# Patient Record
Sex: FEMALE | Race: WHITE | Employment: FULL TIME | URBAN - METROPOLITAN AREA
[De-identification: names, ages, dates, MRNs, and addresses within clinical notes are randomized per-mention and may not be internally consistent; named-entity substitution may affect disease eponyms.]

---

## 2021-11-23 ENCOUNTER — OFFICE VISIT (OUTPATIENT)
Dept: SURGERY | Age: 51
End: 2021-11-23
Payer: COMMERCIAL

## 2021-11-23 VITALS
SYSTOLIC BLOOD PRESSURE: 152 MMHG | TEMPERATURE: 98.1 F | HEART RATE: 98 BPM | HEIGHT: 70 IN | BODY MASS INDEX: 41.95 KG/M2 | DIASTOLIC BLOOD PRESSURE: 86 MMHG | WEIGHT: 293 LBS | OXYGEN SATURATION: 97 % | RESPIRATION RATE: 18 BRPM

## 2021-11-23 DIAGNOSIS — K21.9 CHRONIC GERD: ICD-10-CM

## 2021-11-23 DIAGNOSIS — K58.2 IRRITABLE BOWEL SYNDROME WITH BOTH CONSTIPATION AND DIARRHEA: ICD-10-CM

## 2021-11-23 DIAGNOSIS — E66.01 MORBID OBESITY (HCC): Primary | ICD-10-CM

## 2021-11-23 DIAGNOSIS — G47.33 OSA (OBSTRUCTIVE SLEEP APNEA): ICD-10-CM

## 2021-11-23 DIAGNOSIS — E88.81 INSULIN RESISTANCE: ICD-10-CM

## 2021-11-23 PROCEDURE — 99204 OFFICE O/P NEW MOD 45 MIN: CPT | Performed by: NURSE PRACTITIONER

## 2021-11-23 RX ORDER — FAMOTIDINE 10 MG/1
10 TABLET ORAL AS NEEDED
COMMUNITY
End: 2022-01-11

## 2021-11-23 RX ORDER — MULTIVIT WITH MINERALS/HERBS
1 TABLET ORAL DAILY
COMMUNITY

## 2021-11-23 RX ORDER — ACETAMINOPHEN 500 MG
TABLET ORAL DAILY
COMMUNITY
End: 2022-02-11 | Stop reason: DRUGHIGH

## 2021-11-23 NOTE — PATIENT INSTRUCTIONS
NEXT STEPS:     Surgery type: Possible sleeve gastrectomy vs gastric bypass   Surgeon:  Nakia Bautista MD         Medical Weight Loss program:  Dr. Mamta Rodriguez [phone]       1. Make an appointment with one of the bariatric dietitians to get started with your nutrition classes. Please call the bariatric line at [phone]. Appointments are offered in person and virtual at no charge. [facility] requires a certain number of months of supervised counseling for weight loss, exercise and nutrition before approval for surgery. **    ___ 2 months (60 days)   [address], [address] [place], [address], [employer], [address] [facility], [employer]    ___ 3 months (90 days)   [address], [address] [facility], [place], [address], [address] [facility]    ___ 4 months (120 days) Medicare    ___ 6 months (180 days)    [address] [facility] , Rit Bharat, [address], [address] [facility], [address] [facility], [address] [facility] [address], [facility], [address], [address] [facility], [address] [facility] ([facility])[address], [place]    _x__ 12 months (365 days)   [facility]    2. Call to set up your psychological evaluation with any of the providers below:     [address]  The [facility] [address] behavioral [address], Psy. D [phone]       3. You will be scheduled for an upper GI xray prior to surgery and central scheduling will contact you to arrange. You can reach them at [phone] if needed. 4.  H. Pylori screening and this is a lab (blood) test.   The surgical coordinator will mail you a lab order. If this test is positive, additional testing will be needed prior to surgery. What is Helicobacter pylori (H. pylori)?   Helicobacter pylori (hell-ee-ko-back-ter pie-christian-ee) is a bacteria that can cause infection in the stomach. It is linked to the development of diseases such as dyspepsia, gastritis, and peptic ulcers. It has also been linked to stomach cancer. 5.  You may need other evaluations / testing prior to surgery including:   _x__ Upper endoscopy    ___ Cardiology evaluation     ___ Sleep study / evaluation for sleep apnea     ___ Diabetes treatment center education (goal HgA1C prior to surgery is < 8%)    _x__ 5% weight loss pre surgery (15 lbs)     6. You must be 100% smoke free (tobacco and marijuana) for at least 3 months prior to surgery. Surgery will be cancelled if you are smoking.             If you have any insurance questions or questions about the process, you can contact one of the bariatric coordinators via e-mail:     [email or web address]   or   [email or web address]

## 2021-11-23 NOTE — PROGRESS NOTES
1. Have you been to the ER, urgent care clinic since your last visit? Hospitalized since your last visit? 2. Have you seen or consulted any other health care providers outside of the [address] [place] [address] since your last visit? Include any pap smears or colon screening.

## 2021-12-01 DIAGNOSIS — Z76.89 ENCOUNTER FOR WEIGHT MANAGEMENT: Primary | ICD-10-CM

## 2021-12-01 DIAGNOSIS — E66.01 MORBID OBESITY (HCC): ICD-10-CM

## 2021-12-03 NOTE — PROGRESS NOTES
Chief Complaint   Patient presents with    New Patient     completed seminar, interested in sleeve gastrectomy    Morbid Obesity    Weight Loss       Tiffani Novak is a new patient seeking surgical treatment for morbid obesity. Patient has had obesity for 20+ years. Referred by self. Body mass index is 51.14 kg/m². Seminar  On line     Dietary Habits follows a whole food, plant-based diet. She has been vegetarian for about 25 years. She typically skips breakfast.  She eats a lot of vegetables and plant-based meat substitutes. Liquids  2 cups of coffee and water        Exercise  was walking 10,000 steps a day however she has been having a lot of knee and back issues.     Previous weight reduction efforts    _x__ Unsupervised diets  _x__ Weight Watchers   ___ On line weight loss programs   _x__ Medically supervised weight loss   _x__ Low carb (Keto, Atkins)  ___ Prescription obesity medication       Adult High weight  356 lbs   Adult Low weight  165 lbs     Surgical Risk factors:  General     Diabetes  insulin resistant  insulin no    Current smoker within past 12 months  no     Functional Health Status  yes independent    Partially dependent    Totally dependent    Unknown   Pulmonary     COPD  no   oxygen dependent  no     MAHI (requiring CPAP/BiPap) yes      Gastrointestinal     GERD requiring medication past 30 days  yes      Musculoskeletal     Is ambulation limited most of the time or all the time no     Cardiac    Previous MI no        Hypertension requiring medication  no         Hyperlipidemia requiring medication no     Vascular     History of DVT no         Venous stasis no     IVC filter  no      Renal    Dialysis  no     Renal insufficiency  no     Other  Steroids/Immunosuppressants for chronic condition no      Patient Active Problem List   Diagnosis Code    Morbid obesity with BMI of 50.0-59.9, adult ([facility]) E66.01, Z68.43       Past Surgical History:   Procedure Laterality Date    HX APPENDECTOMY  1985    HX CHOLECYSTECTOMY  2009     Past Medical History:   Diagnosis Date    GERD (gastroesophageal reflux disease)     IBS (irritable bowel syndrome)     Insulin resistance     Morbid obesity (HCC)     Sleep apnea      Family History   Problem Relation Age of Onset    Heart Disease Maternal Grandmother     Hypertension Maternal Grandmother     Diabetes Maternal Grandmother     Heart Disease Maternal Grandfather     Hypertension Maternal Grandfather     Heart Disease Paternal Grandmother     Hypertension Paternal Grandmother     Heart Disease Paternal Grandfather     Hypertension Paternal Grandfather      Social History     Socioeconomic History    Marital status:      Spouse name: Not on file    Number of children: Not on file    Years of education: Not on file    Highest education level: Not on file   Occupational History    Not on file   Tobacco Use    Smoking status: Former Smoker     Packs/day: 1.00     Years: 10.00     Pack years: 10.00     Types: Cigarettes     Quit date:      Years since quittin.9    Smokeless tobacco: Never Used   Substance and Sexual Activity    Alcohol use: Yes     Alcohol/week: 3.0 standard drinks     Types: 3 Glasses of wine per week     Comment: weekends    Drug use: Not Currently    Sexual activity: Not on file   Other Topics Concern    Not on file   Social History Narrative    In the home with her spouse and son who is 32    She works for the [employer]     Social Determinants of Health     Financial Resource Strain:     Difficulty of Paying Living Expenses: Not on file   Food Insecurity:     Worried About [address] in the Last Year: Not on file    [address] in the Last Year: Not on file   Transportation Needs:     Lack of Transportation (Medical): Not on file    Lack of Transportation (Non-Medical):  Not on file   Physical Activity:     Days of Exercise per Week: Not on file    Minutes of Exercise per Session: Not on file   Stress:     Feeling of Stress : Not on file   Social Connections:     Frequency of Communication with Friends and Family: Not on file    Frequency of Social Gatherings with Friends and Family: Not on file    Attends Mandaen Services: Not on file    Active Member of [address] [place] [address] or Organizations: Not on file    Attends Club or Organization Meetings: Not on file    Marital Status: Not on file   Intimate Partner Violence:     Fear of Current or Ex-Partner: Not on file    Emotionally Abused: Not on file    Physically Abused: Not on file    Sexually Abused: Not on file   Housing Stability:     Unable to Pay for Housing in the Last Year: Not on file    Number of [place] in the Last Year: Not on file    Unstable Housing in the Last Year: Not on file       Current Outpatient Medications:     famotidine (Pepcid AC) 10 mg tablet, Take 10 mg by mouth as needed for Heartburn or Gastroesophageal Reflux Disease (GERD). , Disp: , Rfl:     cholecalciferol (VITAMIN D3) (2,000 UNITS /50 MCG) cap capsule, Take  by mouth daily. , Disp: , Rfl:     b complex vitamins tablet, Take 1 Tablet by mouth daily. , Disp: , Rfl:     prenatal 46/EIGQ fum/folic/dha (PRENATAL-1 PO), Take  by mouth., Disp: , Rfl:   No Known Allergies  Bshsi, Not On File, FNP-C    Review of Systems   Constitutional: Positive for malaise/fatigue. Respiratory: Positive for shortness of breath. On CPAP   Cardiovascular: Negative for chest pain, palpitations and leg swelling. Gastrointestinal: Positive for constipation, diarrhea and heartburn. Takes Pepcid  Had endoscopy 5 years ago and was told she had a polyp  She is scheduled for GI visit 12/14/2021 with Dr. Reema Lewis   Genitourinary: Positive for urgency. Musculoskeletal: Positive for back pain, joint pain and myalgias.         Back hip and knee pain   Endo/Heme/Allergies:        Been told she is insulin resistant Psychiatric/Behavioral: Positive for depression. Physical Exam  Constitutional:       Appearance: She is obese. Comments: BP (!) 152/86 Comment: recheck manually after 15 min. anxiety D/T visit per patient  Pulse 98   Temp 98.1 °F (36.7 °C) (Oral)   Resp 18   Ht 5' 10\" (1.778 m)   Wt (!) 356 lb 6.4 oz (161.7 kg)   SpO2 97%   BMI 51.14 kg/m²   Tearful throughout the visit   Cardiovascular:      Rate and Rhythm: Normal rate and regular rhythm. Pulmonary:      Effort: Pulmonary effort is normal.      Breath sounds: Normal breath sounds. Abdominal:      Palpations: Abdomen is soft. Comments: Obese   Musculoskeletal:      Comments: Ambulating independently   Neurological:      General: No focal deficit present. Mental Status: She is alert and oriented to person, place, and time. ICD-10-CM ICD-9-CM    1. Morbid obesity (HCC)  E66.01 278.01 REFERRAL TO GASTROENTEROLOGY      XR UPPER GI SERIES W KUB   2. [facility] 50.0-59.9, [facility] ([facility]  Z68.43 V85.43    3. MAHI (obstructive sleep apnea)  G47.33 327.23    4. Chronic GERD  K21.9 530.81 REFERRAL TO GASTROENTEROLOGY      XR UPPER GI SERIES W KUB   5. Insulin resistance  E88.81 277.7    6. Irritable bowel syndrome with both constipation and diarrhea  K58.2 564.1        Connie Valdez meets criteria established by the NIH. Without weight reduction, co-morbidities will escalate as well as risk of early mortality. Recommendation is patient could be served with surgical weight reduction, the procedure of possibly sleeve gastrectomy however she needs evaluation endoscopically, upper GI and H. pylori testing. I have explained to her that gastric bypass would most likely be a preferred procedure in her situation with her history of insulin resistance, frequent GERD. I explained to the patient differences between laparoscopic gastric bypass and sleeve gastrectomy procedures.  Patient has participated in our informational session either in person or on line. She is also very interested in medical weight loss as she has to do a 12-month program with her insurance company. Procedure to be determined  Surgeon Alren Hinton MD    Recommendations:    _x__ Nutrition Evaluation    _x__ Psychological Evaluation    ___ Cardiac Evaluation    ___ Pulmonary Evaluation    ___ Sleep Medicine     ___ Diabetes Treatment Center     _x__ Upper GI    _x__ Upper endoscopy     _x__ H. Pylori screening (I requested H. pylori testing at the time of her upper endoscopy)    _x__ Smoking cessation x 90 days pre surgery     ___ Committee    _x__ 5% weight reduction (15 lbs)      I have reinforced without lifestyle change and behavior modification, Connie Valdez may not achieve weight loss goals. I reviewed risks and complications associated with each procedure. I discussed a diet high in protein, low-fat, low- sugar, limited carbohydrates, and discontinuing use of carbonated beverages and all sweet drinks. Importance of life long follow up was discussed as well as the chronic nature of obesity. Obesity is affected by multiple factors, including diet, exercise, stress, sleep, medication, age, hormones, etc.  Surgery is not a cure for obesity, yet can be an effective tool in the management of obesity. 45 minutes spent ___ virtually or in _x__ face to face with Geraldine Valdez > 50% counseling.     CC Bshsi, Not On File, FNP-C

## 2022-01-11 ENCOUNTER — OFFICE VISIT (OUTPATIENT)
Dept: SURGERY | Age: 52
End: 2022-01-11
Payer: COMMERCIAL

## 2022-01-11 ENCOUNTER — TELEPHONE (OUTPATIENT)
Dept: SURGERY | Age: 52
End: 2022-01-11

## 2022-01-11 VITALS
TEMPERATURE: 98.6 F | HEART RATE: 117 BPM | SYSTOLIC BLOOD PRESSURE: 139 MMHG | RESPIRATION RATE: 20 BRPM | BODY MASS INDEX: 41.95 KG/M2 | HEIGHT: 70 IN | WEIGHT: 293 LBS | DIASTOLIC BLOOD PRESSURE: 79 MMHG

## 2022-01-11 DIAGNOSIS — Z83.49 FAMILY HISTORY OF THYROID DISEASE: ICD-10-CM

## 2022-01-11 DIAGNOSIS — R00.0 INCREASED HEART RATE: ICD-10-CM

## 2022-01-11 DIAGNOSIS — Z82.49 FAMILY HISTORY OF ANEURYSM: ICD-10-CM

## 2022-01-11 DIAGNOSIS — Z78.9 VEGAN DIET: ICD-10-CM

## 2022-01-11 DIAGNOSIS — E55.9 VITAMIN D DEFICIENCY: ICD-10-CM

## 2022-01-11 DIAGNOSIS — E66.01 CLASS 3 SEVERE OBESITY DUE TO EXCESS CALORIES WITH SERIOUS COMORBIDITY AND BODY MASS INDEX (BMI) OF 50.0 TO 59.9 IN ADULT (HCC): Primary | ICD-10-CM

## 2022-01-11 DIAGNOSIS — Z82.49 FAMILY HISTORY OF BLOOD CLOTS: ICD-10-CM

## 2022-01-11 DIAGNOSIS — F43.22 ADJUSTMENT DISORDER WITH ANXIOUS MOOD: ICD-10-CM

## 2022-01-11 DIAGNOSIS — Z83.3 FAMILY HISTORY OF DIABETES MELLITUS (DM): ICD-10-CM

## 2022-01-11 DIAGNOSIS — N92.6 IRREGULAR MENSES: ICD-10-CM

## 2022-01-11 DIAGNOSIS — Z90.49 S/P LAPAROSCOPIC CHOLECYSTECTOMY: ICD-10-CM

## 2022-01-11 DIAGNOSIS — D05.81 INTRADUCTAL CARCINOMA AND LOBULAR CARCINOMA IN SITU (LCIS) OF RIGHT BREAST: ICD-10-CM

## 2022-01-11 DIAGNOSIS — L65.9 HAIR LOSS: ICD-10-CM

## 2022-01-11 DIAGNOSIS — Z99.89 OSA ON CPAP: ICD-10-CM

## 2022-01-11 DIAGNOSIS — K59.09 OTHER CONSTIPATION: ICD-10-CM

## 2022-01-11 DIAGNOSIS — Z82.49 FAMILY HISTORY OF HEART DISEASE: ICD-10-CM

## 2022-01-11 DIAGNOSIS — G47.33 OSA ON CPAP: ICD-10-CM

## 2022-01-11 DIAGNOSIS — K21.9 CHRONIC GERD: ICD-10-CM

## 2022-01-11 DIAGNOSIS — R63.5 ABNORMAL WEIGHT GAIN: ICD-10-CM

## 2022-01-11 PROCEDURE — 99215 OFFICE O/P EST HI 40 MIN: CPT | Performed by: FAMILY MEDICINE

## 2022-01-11 RX ORDER — OMEPRAZOLE 40 MG/1
CAPSULE, DELAYED RELEASE ORAL
COMMUNITY
Start: 2021-12-13 | End: 2022-01-11

## 2022-01-11 RX ORDER — SODIUM PICOSULFATE, MAGNESIUM OXIDE, AND ANHYDROUS CITRIC ACID 10; 3.5; 12 MG/160ML; G/160ML; G/160ML
LIQUID ORAL AS DIRECTED
COMMUNITY
Start: 2021-12-15 | End: 2022-03-01 | Stop reason: ALTCHOICE

## 2022-01-11 RX ORDER — OMEPRAZOLE 40 MG/1
40 CAPSULE, DELAYED RELEASE ORAL DAILY
COMMUNITY
End: 2022-02-25

## 2022-01-11 NOTE — PROGRESS NOTES
200 [facility] 49, 200 [facility] 22.  [phone] o  525 [facility] EXAMINATION    Patient:  Lidia Tran, 1970  PCP:   Flroa Henning, Not On File, MD  Patient Status: new    Referred by:  self Reason referred:  I can not lose weight. I need help. Every year I am creeping up 10-20 lbs and it is starting to affect me. Dr. John Brandt, my breast surgeon is recommending bariatric surgery to get my weight down to prevent my LCIS stage 0 from progressing into breast cancer. I have already met Damián junior RD/ Dr. Hanna Bravo bariatric surgeon. Lidia Tran is a 45 y.o. female who is a patient with Obesity Class 3 Body mass index is 51.55 kg/m². and presents today for evaluation and treatment. How did patient hear about this weight management program: I saw it on the website when I was researching bariatric surgical program  Patient has attended the [facility] Orientation: yes      Patient's weight management approach preference today:  [facility]    Patient goals for participation in weight management program:   Lose at least 140 lbs  (weight of 219 lbs) - I prefer a moving target. I want to learn    Start weight today 2022:  359 lbs 4.8 oz     Weight Metrics 2021   Weight - 359 lb 4.8 oz 356 lb 6.4 oz   Neck Circ (inches) 17.5 - -   Waist Measure Inches 61.5 - -   Body Fat % 50.5 - -   BMI - 51.55 kg/m2 51.14 kg/m2     Neck Circumference:  Acceptable range for M >16 inches, F>15 inches  Waist Circumference:  Acceptable range for M> 40 inches/102 cm, F > 35 inches, 88 cm  Body Fat: Acceptable range for M 18-24%, F 25-31%    When did patient begin gaining excess weight:  I was born 11 lbs. It became a significant issue in  when my mom , I gained [de-identified] lbs. Since that time, I lose 10 lbs, gain 20 lbs. How much excess weight has been gained:   At least 80 lbs since 2007  Is patient ready to start participating in this weight loss journey and reason(s) why: yes, \"weight is creating negative impacts for me physically, functionally and emotionally\"  Any potential unsupportive people in patient's life: 0    WEIGHT LOSS HISTORY  Lowest weight in adulthood: 168 lbs  Highest weight in lifetime:   359 lbs  Previous weight loss programs: Weight Watchers, Medi-Fast, [employer] and Keto and [facility] w Dr. Sharda Orozco (Rx Aayush Rj)  Previous OTC weight loss medications, herbal remedies/supplements: OTC Ephreda (removed from market due to MI)  Previous prescription weight loss medication:  Rx Saxenda 3.0 mg SC daily w Dr. Neville Bashir  Negative side effects: Aayush Rj stopped working (I took it when [employer] pandemic started). I experienced constipation, severe abdominal pain causing me not to be able to stand straight.)  Pounds lost with use:  17 lbs  Factors contributing to weight re-gain:  Stopping Saxenda d/t GI symptoms and it not working, my diet    Previous Weight Loss Surgery:  0      Past Surgical History:   Procedure Laterality Date    HX APPENDECTOMY  1985    HX BREAST BIOPSY Right 2017    due to LCIS. Dr. Soto Ing HX CHOLECYSTECTOMY  2009    HX COLONOSCOPY  2017    w polypectomy. due q 5 yrs. Dr. Candice Martínez.  HX ENDOSCOPY  2017    due to severe GERD.   Dr. Dani Boucher who had weight loss surgery/type of surgery: 0  Family history of:  Obesity- yes, Kidney stones- 0, Gallstones- 0, Diabetes- yes, Heart Disease- yes, Sleep Apnea-  yes, Gout- 0  Family History   Problem Relation Age of Onset    Heart Disease Maternal Grandmother     Hypertension Maternal Grandmother     Diabetes Maternal Grandmother     Heart Disease Maternal Grandfather     Hypertension Maternal Grandfather     Heart Disease Paternal Grandmother     Hypertension Paternal Grandmother     Heart Disease Paternal Grandfather     Hypertension Paternal Grandfather     Diabetes Mother     Hypertension Mother     Breast Cancer Mother     Obesity Mother     Heart Disease Father     Hypertension Father     Obesity Father     Sleep Apnea Father     Other Father 46         IN HIS SLEEP D/T ANEURYSM    Heart Disease Sister     Hypertension Sister     Obesity Sister     Sleep Apnea Sister     Other Sister 52         IN HER SLEEP D/T ANEURYSM    Hypertension Brother     Deep Vein Thrombosis Brother        ASSOCIATED MEDICAL CONCERNS  Patient denies any contraindications to participation in LCD or VLCD including: history of MI in the last 3 months, Type 1 DM, Type 2 DM, Liver or Kidney disease requiring protein restriction, Recent treatment for Cancer, History of Uric-acid Kidney Stone, Gout, Gall stones, Recent onset of Inflammatory Bowel Disease, severe Food Allergies or Lactose Intolerance. Past Medical History:   Diagnosis Date    Colon polyps     Constipation     Gallstone     GERD (gastroesophageal reflux disease)     Dr. Skip Deleon    Hair loss     IBS (irritable bowel syndrome)     Insulin resistance     Intraductal carcinoma and lobular carcinoma in situ (LCIS) 2017    (pre-cancerous). stage 0. [address] Dr. Gricelda Palacio, breast surgeon    Morbid obesity ([facility] [address]     MAHI on CPAP     Dr. Ngozi Canales Stool color black     Vitamin D deficiency        BEHAVIORAL HEALTH HISTORY  DEPRESSION SCREENING:    3 most recent PHQ Screens 2022   Little interest or pleasure in doing things Not at all   Feeling down, depressed, irritable, or hopeless Not at all   Total Score PHQ 2 0       History of drug abuse or dependence:  0  History of mental health conditions (including Depression, Anxiety, Anorexia, Bulimia or Binge Eating disorder): Situational Anxiety or Depression  Treating provider and medication(s): 0  Current major lifestyle changes or stressors:   New election and impact on my quality of life.   Possibly will have a new supervisor and no longer be able to work from home. Is the mental health condition controlled: yes    Outpatient Medications Marked as Taking for the 1/11/22 encounter (Office Visit) with Tiffanie Ruggiero, DO   Medication Sig Dispense Refill    omeprazole (PRILOSEC) 40 mg capsule Take 40 mg by mouth daily.  cholecalciferol (VITAMIN D3) (2,000 UNITS /50 MCG) cap capsule Take  by mouth daily.  b complex vitamins tablet Take 1 Tablet by mouth daily.  prenatal 22/QSJV fum/folic/dha (PRENATAL-1 PO) Take  by mouth. Medications that cause weight gain:  Prilosec  Medications that cause weight loss:  0  Any changes to medications since last office visit with me:   I just started Prilosec 40 mg daily 12/11/21 bc GI symptoms worsened. No Known Allergies    EATING HABITS  Total calories consumed per day:  ? Kcal, typically 6636-3940   Number of meals consumed per day on average: 2  Number of times per week fast food or meals at/from restaurants is consumed: 0  Typical Meals:       Breakfast: skip or OTC Premiere Protein      Lunch:  Salad w veggie sausage or veggie chicken      Dinner: baked potato w low fat ranch, corned chicken (vegetarian meat)      Snacks: oranges  Barriers to eating healthy meals:  I am not hungry in the mornings so I do not eat.     DRINKING HABITS  How much water do you consume daily: 4-6 16.9 oz/day    How much caffeine do you drink daily: 2 8-10 oz coffee/day w 1 Stevia and non dairy creamer, 1 C hot herbal tea or green tea w Stevia q pm  Alcohol use:  1-5 glasses/day on weekends   Any other sugar-sweetened beverages daily (sodas, teas, juices, etc.): 3 cans/week Wegman's Diet Grapefruit soda  Barriers to consuming at least 2 L water daily:   Trying to sip and not gulp, like my RD w insurance company is recommending  Social History     Tobacco Use    Smoking status: Former Smoker     Packs/day: 1.00     Years: 10.00     Pack years: 10.00     Types: Cigarettes     Quit date:      Years since quitting: 15.0    Smokeless tobacco: Never Used   Vaping Use    Vaping Use: Never used   Substance Use Topics    Alcohol use: Yes     Alcohol/week: 3.0 standard drinks     Types: 3 Glasses of wine per week     Comment: weekends    Drug use: Not Currently         SLEEP HISTORY  Hours of sleep nightly: 8 hours  Rx or OTC Sleep aids used:  0  Any eating while asleep:  0  Any snoring:  yes Daytime naps:  0 since CPAP  Daytime Sleepiness:  0  Sleep History:   MAHI w CPAP   Any CPAP machine use: yes in     Sleep Medicine Specialist:  Dr. Sandra Browning  Occupation:    Barriers to getting proper rest:  0    PHYSICAL ACTIVITY HISTORY  Type of physical activity:  0 - previously walking in my home until dxd w LCIS. We just bought a total gym. Average number steps per day:  4-6000  Gym membership owned:  0 Actively utilizing the gym:  0  Pets owned:  2 dogs - Ples Postin  Is physical activity enjoyable:  na  Have you ever been told by a physician or anyone else not to exercise: 0  Barriers limiting physical activity:  Working from home and being focused on tasks. Not being able to move    Mobile Apps used for meals, water consumption, sleep, physical activity: Fit Bit    OB/GYN HISTORY (For Female Patients)  Social History     Substance and Sexual Activity   Sexual Activity Yes    Partners: Male    Birth control/protection: Condom     OB History        3    Para   1    Term   1            AB   2    Living   1       SAB   2    IAB        Ectopic        Molar        Multiple        Live Births   1               Menopause:  0   LMP:  Patient's last menstrual period was 01/10/2022. Are you pregnant or planning on becoming pregnant within 6 months:  no      Other Medical Care and Concerns  I saw my oncologist and had my MRI for LCIS. Doing well. I saw my pcp for annual exam.  Doing well. Need a new pcp. I saw my GI for heart burn.   Rxd Prilosec. It is helping. I saw KATERINA Mahan/ Dr. Barcenas Heads for bariatric surgery. Planning testing for [facility]. Do you have upcoming travel in the next 6 weeks:  0. If so, contact the dietician for meal plan modification and recommendations. Immunization History   Administered Date(s) Administered    COVID-19, PFIZER, MRNA, LNP-S, PF, 30MCG/0.3ML DOSE 03/04/2021, 03/25/2021, 12/13/2021    Influenza Vaccine 12/13/2021       HEALTH MAINTENANCE  A1C:  SEE RESULTS BELOW. Lipid panel:  SEE RESULTS BELOW. Colonoscopy:  2017, if over 47 yo   Depression screening:  Performed today  Mammogram:  ?, if female over 37 yo  Annual Wellness Visit:  To be performed by pcp annually, when appropriate. ROS:  Review of Systems negative except as noted above in HPI. PHYSICAL EXAMINATION    Visit Vitals  /79 (BP 1 Location: Right arm, BP Patient Position: Sitting, BP Cuff Size: Thigh)   Pulse (!) 117   Temp 98.6 °F (37 °C)   Resp 20   Ht 5' 10\" (1.778 m)   Wt (!) 359 lb 4.8 oz (163 kg)   LMP 01/10/2022   BMI 51.55 kg/m²         Weight Metrics 1/11/2022 1/11/2022 11/23/2021   Weight - 359 lb 4.8 oz 356 lb 6.4 oz   Neck Circ (inches) 17.5 - -   Waist Measure Inches 61.5 - -   Body Fat % 50.5 - -   BMI - 51.55 kg/m2 51.14 kg/m2        General appearance - Well nourished. Well appearing. Well developed. No acute distress. Obese. Head - Normocephalic. Atraumatic. Eyes -  Extraocular eye movements intact. Sclera anicteric. Ears - Hearing is grossly normal bilaterally. Nose - normal and patent. Neck - supple. Midline trachea. No carotid bruits noted bilaterally. No thyromegaly noted. Chest - clear to auscultation bilaterally anteriorly and posteriorly. No wheezes. No rales or rhonchi. Breath sounds are symmetrical bilaterally. Unlabored respirations. Heart - high rate, rechecked at 90 bpm.  Regular rhythm. Normal S1, S2. No murmur noted. No rubs, clicks or gallops noted.   Abdomen - soft and distended. No masses or organomegaly. No rebound, rigidity or guarding. Bowel sounds normal x 4 quadrants. No tenderness noted. Neurological - awake, alert and oriented to person, place, and time and event. Cranial nerves II through XII intact. Clear speech. Muscle strength is +5/5 x 4 extremities. Steady gait. Heme/Lymph - peripheral pulses normal x 4 extremities. No peripheral edema is noted. Musculoskeletal - Intact x 4 extremities. Full ROM x 4 extremities. No pain with movement. Back exam - normal range of motion. No CVA tenderness. Negative Straight Leg Test bilaterally. No buffalo hump noted. Skin - no rashes, erythema, ecchymosis, lacerations, abrasions, suspicious moles noted. No skin tags or moles. No acanthosis nigricans noted in the axilla or neck. Psychological -   normal behavior, dress and thought processes. Good insight. Good eye contact. Normal affect. Appropriate mood. Normal speech. Visibly anxious. DATA REVIEWED:    No results found for: WBC, WBCLT, HGBPOC, HGB, HGBP, HCTPOC, HCT, PHCT, RBCH, PLT, MCV, HGBEXT, HCTEXT, PLTEXT, HGBEXT, HCTEXT, PLTEXT  No results found for: NA, K, CL, CO2, AGAP, GLU, BUN, CREA, BUCR, GFRAA, GFRNA, CA, TBIL, TBILI, AP, TP, ALB, GLOB, AGRAT, ALT, AST  No results found for: HBA1C, NVN1WITS, NXY0XXGD, FKK8LVSP   No results found for: TSH, TSH2, TSH3, TSHP, TSHEXT, TSHEXT  No results found for: URICA, UAU1  No results found for: MG  No results found for: B12LT, FOL, RBCF   No results found for: VITD3, XQVID2, XQVID3, XQVID, VD3RIA, OTPJ66CUCBC     No results found for: IRON, FE, TIBC, IBCT, PSAT, FERR  No results found for: CHOL, CHOLPOCT, CHOLX, CHLST, CHOLV, HDL, HDLPOC, HDLP, LDL, LDLCPOC, LDLC, DLDLP, VLDLC, VLDL, TGLX, TRIGL, TRIGP, TGLPOCT, CHHD, CHHDX   No results found for this or any previous visit.      EKG:  Not available    ASSESSMENT     ICD-10-CM ICD-9-CM    1. Class 3 severe obesity due to excess calories with serious comorbidity and body mass index (BMI) of 50.0 to 59.9 in adult ([facility] [address]  E66.01 278.01     Z68.43 V85.43    2. Abnormal weight gain  R63.5 783.1 EKG, 12 LEAD, INITIAL      HCG QL SERUM      INSULIN      MAGNESIUM      METABOLIC PANEL, COMPREHENSIVE      THYROID PEROXIDASE (TPO) AB      TSH 3RD GENERATION      URIC ACID    fluctuates   3. Intraductal carcinoma and lobular carcinoma in situ (LCIS) of right breast  D05.81 233.0    4. Hair loss  L65.9 704.00 TSH 3RD GENERATION      BOYD BY MULTIPLEX FLOW IA, QL      RPR    due to stress vs other   5. MAHI on CPAP  G47.33 327.23     Z99.89 V46.8    6. Increased heart rate  R00.0 785.0     due to anxiety vs other   7. Vegan diet  Z78.9 V49.89 IRON      VITAMIN B12 & FOLATE   8. Vitamin D deficiency  E55.9 268.9 VITAMIN D, 25 HYDROXY   9. Adjustment disorder with anxious mood  F43.22 309.24    10. S/P laparoscopic cholecystectomy  Z90.49 V45.89    11. Chronic GERD  K21.9 530.81     stable on Prilosce 40 mg daily   12. Irregular menses  N92.6 626.4     due to parrish-menopause vs other   13. Other constipation  K59.09 564.09     resolved   14. Family history of diabetes mellitus (DM)  Z83.3 V18.0 HEMOGLOBIN A1C WITH EAG   15. Family history of heart disease  Z82.49 V17.49 NMR LIPOPROFILE WITH LIPIDS (WITHOUT GRAPH)   16. Family history of thyroid disease  Z83.49 V18.19    17. Family history of aneurysm  Z82.49 V19.8    25. Family history of blood clots  Z82.49 V18.3    19. BMI 50.0-59.9, adult [facility])  Z68.43 V85.43          WEIGHT MANAGEMENT PLAN    Welcomed patient to our [facility] wedgies Management Program - Medical Services. Agree with nurse documentation. Chart was reviewed and updated during the office visit today. Reviewed and discussed notes from other providers:   KATERINA Chang. Encouraged patient to follow their recommendations and follow up plans.   Get documentation from Dr. Lakesha Cook, former pcp     Emphasized the importance of patient continuing care from current primary care provider and other specialists while participating in this weight management program.  Patient has full access to all our office notes, orders, lab results on [employer] and can provide them copies, if desired. Advised patient to follow up with pcp for any acute symptoms and Health Maintenance. Referrals given as noted above. Informed patient about Obesity medicine treatment options available at the [facility] including nutritional counseling, weight loss medication management and behavioral counseling. Based on patient history, diagnostic tests and physical exam performed today in our office, Wadell Severe is a good candidate for the [facility] Services using the Intrakrard TheInfoPro meal replacements for Very Low Calorie Dietary approach (800 kcal daily. ) - pending labwork and EKG      During this visit, patient met with Donita Huynh,  today to review and sign [facility] Commitment Form, Attendance Policy, and [facility] Agreement and Consent, further discuss program design, assist patient with ordering New Aptible meal replacements and schedule initial 1:1 visit with dietician, mandatory weekly (VLCD 800 kcal/day)or bi-monthly (LCD/1251-0369 kcal/day) nurse visits. SEE SCANNED DOCUMENTS. Referred patient to [facility] Patient Manual to become familiar with potential side effects of this dietary approach and what to do if such symptoms occur. Encouraged patient to notify our office if any new symptoms develop and persist.      Discussed the patient's medications, BMI, anthropometrics and goals. Informed patient that weight loss goal of 5-10% in 6-12 months has shown significant improvement in obesity and its related health conditions including DM, heart disease, asthma.   A key study published in Arthritis & Rheumatism of Overweight and Obese Adults with OA found that losing 1 pound of weight resulted in 4 pounds of pressure being removed from the knees. Nutritional Prescription:    Start VLCD (very low calorie diet) with 800 calories consumed daily using 3-4 Robard New Direction meal replacements. Referred patient to Registered Dietitian for initial 30 minute 1:1 nutritional counseling, include discussions about how to count daily calories and stay within the recommended amount per day as agreed upon today, simple vs complex carbohydrates. Advised patient to avoid skipping meals. Consume meals every 3-4 hours to prevent low blood sugar events. Discussed the option of starting slowly during first couple of weeks of participation by weaning down caffeine first then simple carbohydrates the following weeks (like sweet foods/simple carbohydrates, baked goods) and white foods (like potatoes, rice, pasta, bread.)    Permission granted to utilize LCD when patient is attending important family, business or social events involving \"regular\" foods. If utilizing LCD approach, consume green leafy vegetables and protein (lean meats and legumes) with each meal first then add minimal complex carbohydrates. Keep calories between 1229-8766 kcal/day. Avoid fried foods and limit saturated fats. Avoid GERD triggers. If blood pressure is elevated, limit processed foods, caffeine and sodium intake including hot sauce and soy sauce. Stressed importance of patient drinking a minimum of 2 L (67 oz) of water daily up to half patient's body size in ounces of water, while utilizing this dietary approach unless instructed otherwise by a provider. This will help increase satiety and prevent dehydration. Avoid sugar-sweetened beverages including diet or regular soda, juice, alcohol, use of water enhancers. Try on-line recipes for water infusion, if desired. Limit caffeine to prevent bladder issues and dehydration.   Ok to drink 1 8 oz cup of black coffee or green tea (to stimulate release of Adiponectin and promote fat burning.)     Physical Activity Prescription: Minimal and as tolerated while initiating this dietary approach. Discussed approaches to increasing physical activity as part of daily routine. Encouraged strength training, resistance or toning exercises daily to prevent muscle mass loss. Count steps daily with a goal of 5,000-10,000 steps or 2.5-5.0 miles daily. Behavior and Lifestyle Prescription:  Counseled patient on stressors, stress management and self-care approaches. Encouraged patient to seek counseling to better address stressors identified today. If already receiving formal counseling please continue these sessions routinely. Go to ER if any thoughts or attempts of suicide are experienced. Sleep Prescription: A goal of 7-9 hours of uninterrupted sleep is recommended to turn off the Grehlin hormone to be released from the stomach and triggers appetite while promoting weight gain. Proper rest turns on Leptin hormone to be released from white adipose tissue and promotes weight loss. Discussed snoring, symptoms of Sleep Apnea and improvements with weight loss. If patient currently has MAHI and uses CPAP or BiPAP, encouraged patient to continue its use whenever resting. Consume any intake of caffeine 6-12 hours before bedtime to avoid sleep disturbances. Limit screen time 1-2 hours before bedtime. Avoid exercising 2-3 hours before bedtime. Ok to try drinking OTC Sleepy Time Tea, Chamomile tea or Magnesium 400 mg at bedtime for sleep, if receiving less than 7-9 hours nightly. Continue current medications as directed by prescribers. Identified and discussed weight positive and weight negative medications on patient's medication list.  Advised patient not to stop any use without discussing with the prescribing provider. Will monitor patient's weight and health with use.     Weight loss medication prescribed today and sent electronically to preferred pharmacy on file after discussing benefits, contraindications and potential side effects:  0     Supplement recommendations:  Start OTC Magnesium 400 mg po at night prn sleep, muscle cramping, constipation. Start OTC vit B12 1000 mcg daily orally if taking Metformin or experiencing numbness and tingling. Start OTC Algal Oil (since vegan) 1000 mg daily if experiencing dry skin, hair loss or low HDL. Use with caution if history of heartburn exists. Increase fiber supplements or try Fiber products if experiencing constipation. Take OTC Lactaid and/or Gas-X as needed with meal replacements, if lactose intolerance history exists or symptoms begin. Reviewed and discussed most recent lab and EKG results. If not available today, advised patient to sign a release to provide our program a copy of each from pcp or specialist.    An order form for initial lab panel was given to patient today to be drawn one week prior to next appointment with me. An order form was given to patient today to be performed after the office visit today to be done before starting the program.  Otherwise, will recheck pertinent labs monthly while participating in VLCD or every 3 months while participating in LCD, if using New Direction meal replacements, as discussed to identify electrolyte abnormalities and guide therapeutic approach. Advised patient to sign up for [employer] and view labs directly. Notify me by [facility] if any questions or concerns arise. Labs ordered today will be reviewed in detail at the next office visit and time allowed for any questions regarding the results.      Counseled patient on health topics:  Obesity, Insulin Resistance, VLCD and LCD dietary approaches, benefits, contraindications, possible side effects to these diets and how to prevent poor outcomes (including staying hydrated, limit physical exercise while transitioning into this program, avoid skipping meals, etc), Weight loss goals, Sleep hygiene, Barriers to losing weight and keeping weight off, stressors. Immunizations noted. Influenza and Covid-19 vaccines recommended, if patient has not had it. Informed patient that Obesity may increase risk for severe illness and death from Covid-19 disease. Offered empathy, encouragement, legitimation, prayers, partnership to patient. Praised patient for successes. Patient was offered a choice/choices in the treatment plan today. Patient expresses understanding of the plan and agrees with recommendations. Return in about 1 month (around 2/11/2022) for results, ND VLCD. Total time:  80 mins spent with patient in counseling, coordinating care, reviewing and discussing results, reviewing and completing relevant documentation, and discussing treatment plans in reference to The primary encounter diagnosis was Class 3 severe obesity due to excess calories with serious comorbidity and body mass index (BMI) of 50.0 to 59.9 in adult [facility]). Diagnoses of Abnormal weight gain, Intraductal carcinoma and lobular carcinoma in situ (LCIS) of right breast, MAHI on CPAP, Increased heart rate, Vitamin D deficiency, Adjustment disorder with anxious mood, Hair loss, S/P laparoscopic cholecystectomy, Vegan diet, Chronic GERD, Irregular menses, Other constipation, Family history of diabetes mellitus (DM), Family history of heart disease, Family history of thyroid disease, Family history of aneurysm, Family history of blood clots, and BMI 50.0-59.9, adult ([facility] were also pertinent to this visit. THANK YOU Bsi, Not On File, MD FOR ALLOWING ME THE PRIVILEGE TO PARTICIPATE IN THE CARE OF OUR MUTUAL PATIENT, Ms. Valdez WITH RESPECT TO WEIGHT MANAGEMENT. Giuliano Hoyos DO, Diplomate [employer]TY GILLETTE    Patient Instructions   Congratulations on starting the Very Low Calorie Diet! This requires you to consume 3-4 meal replacements a day as your meals (approximately 200 kcal each meal).      Please meet with the dietician to learn how to calculate your total calories daily and/or use one of the suggested Mobile Apps listed in your patient instructions today. Your total calories consumed each day should not be less than or exceed 800 kcalories. 1. If you experience any new syptoms once you start this dietary approach, refer to your [facility] Program  Patient Manual for a list of all potential side effects of the VLCD and recommendations for what to do to improve or resolved the negative side effect. For constipation, do not allow more than 3 days to pass you without having a bowel movement. As mentioned at your provider monthly visit, you can try taking OTC Magnesium 400 mg at bedtime for muscle cramping, difficulty sleeping or constipation or try Milk of Magnesia, Miralax, or Smooth Move Tea for constipation. If you have kidney disease, try OTC Colace instead. Please make sure you are consuming a minimal of 2 liter (67 oz) and up to half your body weight in ounces of water every day to reduce risk experiencing the potential negative side effects. 2. If you experience new dizziness and have consumed your proper water intake, you may drink one 8 oz coffee mug of broth or a bouillon cube in water. 3. Remember to get your labs drawn with your preferred laboratory one week prior to your 3rd monthly visit with your provider. 4. Attend the required nurse triage weigh-ins every other week at the office. Make sure homework sheets are completed prior to arrival or you will not be seen and cannot  meal products. 5. Have a reliable scale and try to use the same scale each time you weigh at home. Best weights are yielded when you weight without clothing or shoes and measure before the first meal of the day. 6. Attend the weekly nutritional meetings on Thursdays with the dietician virtually as scheduled.      7.  If you plan to schedule your next 2 appointments with your provider using a virtual platform, please have a reliable blood pressure cuff and scale available. You will be asked to report your weight, blood pressure and heart rate at the time of checking in with the nurse that day. [address], , at [phone] with any questions or concerns related to the program.       Remember, just 5-10% loss of your total weight in a 6-12 month time period has shown significant improvement in Obesity and its associated medical conditions like Diabetes, Hypertension, High Cholesterol, Asthma, etc.    For every 1 pound of fat that you lose, 4 pounds of pressure is relieved from your joints. Again, WELCOME TO THE [facility] WEIGHT MANAGEMENT EXPERIENCE! We are thrilled you have chosen us to take this weight loss journey with you and honored to do so. We look forward to working very closely with you as you achieve a healthier life. Many blessings!

## 2022-01-11 NOTE — PATIENT INSTRUCTIONS
Congratulations on starting the Very Low Calorie Diet! This requires you to consume 3-4 meal replacements a day as your meals (approximately 200 kcal each meal). Please meet with the dietician to learn how to calculate your total calories daily and/or use one of the suggested Mobile Apps listed in your patient instructions today. Your total calories consumed each day should not be less than or exceed 800 kcalories. 1. If you experience any new syptoms once you start this dietary approach, refer to your [facility] Program  Patient Manual for a list of all potential side effects of the VLCD and recommendations for what to do to improve or resolved the negative side effect. For constipation, do not allow more than 3 days to pass you without having a bowel movement. As mentioned at your provider monthly visit, you can try taking OTC Magnesium 400 mg at bedtime for muscle cramping, difficulty sleeping or constipation or try Milk of Magnesia, Miralax, or Smooth Move Tea for constipation. If you have kidney disease, try OTC Colace instead. Please make sure you are consuming a minimal of 2 liter (67 oz) and up to half your body weight in ounces of water every day to reduce risk experiencing the potential negative side effects. 2. If you experience new dizziness and have consumed your proper water intake, you may drink one 8 oz coffee mug of broth or a bouillon cube in water. 3. Remember to get your labs drawn with your preferred laboratory one week prior to your 3rd monthly visit with your provider. 4. Attend the required nurse triage weigh-ins every other week at the office. Make sure homework sheets are completed prior to arrival or you will not be seen and cannot  meal products. 5. Have a reliable scale and try to use the same scale each time you weigh at home. Best weights are yielded when you weight without clothing or shoes and measure before the first meal of the day. 6. Attend the weekly nutritional meetings on Thursdays with the dietician virtually as scheduled. 7.  If you plan to schedule your next 2 appointments with your provider using a virtual platform, please have a reliable blood pressure cuff and scale available. You will be asked to report your weight, blood pressure and heart rate at the time of checking in with the nurse that day. [address], , at [phone] with any questions or concerns related to the program.       Remember, just 5-10% loss of your total weight in a 6-12 month time period has shown significant improvement in Obesity and its associated medical conditions like Diabetes, Hypertension, High Cholesterol, Asthma, etc.    For every 1 pound of fat that you lose, 4 pounds of pressure is relieved from your joints. Again, WELCOME TO THE [facility] WEIGHT MANAGEMENT EXPERIENCE! We are thrilled you have chosen us to take this weight loss journey with you and honored to do so. We look forward to working very closely with you as you achieve a healthier life. Many blessings!

## 2022-01-11 NOTE — TELEPHONE ENCOUNTER
Called to pt. RE: name of PCP, and or last lab and EKG result. Verified pt name and date of birth. Pt indicated to writer that she has not had labs, or EKG done since she left Dr. Rhonda Sheffield office. Pt stated that she did consult with a GI and oncology and had a yearly physical with her GYN; but none of these Dr.'s ordered labs or an EKG. Pt then indicated that she would go and have labs or EKG if Dr. Laurence Carvalho would like.

## 2022-01-11 NOTE — PROGRESS NOTES
1. Have you been to the ER, urgent care clinic since your last visit? Hospitalized since your last visit? no    2. Have you seen or consulted any other health care providers outside of the [address] [place] [address] since your last visit? Include any pap smears or colon screening. No     Pt blood pressure is high. Pt not on Blood pressure mediation. Pt states she has been monitoring Bp at home Bp has been w/i normal range. Pt denied blurry vision, headache, SOB, chest pain or weakness.

## 2022-01-12 ENCOUNTER — HOSPITAL ENCOUNTER (OUTPATIENT)
Dept: NON INVASIVE DIAGNOSTICS | Age: 52
Discharge: HOME | End: 2022-01-12
Payer: COMMERCIAL

## 2022-01-12 ENCOUNTER — CLINICAL SUPPORT (OUTPATIENT)
Dept: SURGERY | Age: 52
End: 2022-01-12

## 2022-01-12 DIAGNOSIS — E66.01 CLASS 3 SEVERE OBESITY DUE TO EXCESS CALORIES WITH SERIOUS COMORBIDITY AND BODY MASS INDEX (BMI) OF 50.0 TO 59.9 IN ADULT (HCC): Primary | ICD-10-CM

## 2022-01-12 DIAGNOSIS — R63.5 ABNORMAL WEIGHT GAIN: ICD-10-CM

## 2022-01-12 LAB
ATRIAL RATE: 106 BPM
CALCULATED P AXIS, ECG09: 68 DEGREES
CALCULATED R AXIS, ECG10: 52 DEGREES
CALCULATED T AXIS, ECG11: 37 DEGREES
DIAGNOSIS, 93000: NORMAL
P-R INTERVAL, ECG05: 148 MS
Q-T INTERVAL, ECG07: 322 MS
QRS DURATION, ECG06: 108 MS
QTC CALCULATION (BEZET), ECG08: 427 MS
VENTRICULAR RATE, ECG03: 106 BPM

## 2022-01-12 PROCEDURE — 93005 ELECTROCARDIOGRAM TRACING: CPT

## 2022-01-12 NOTE — PROGRESS NOTES
[facility]  Metabolic Program Initial Nutrition Consult    Date: 2022   Physician: Sadaf Case DO  Name: Carlos Schmidt  :  1970    Type of Plan: VLCD  Weeks on Plan:  Started today  Virtual visit was completed through [address] [place] [address]. ASSESSMENT:      Medications/Supplements:   Prior to Admission medications    Medication Sig Start Date End Date Taking? Authorizing Provider   omeprazole (PRILOSEC) 40 mg capsule Take 40 mg by mouth daily. Provider, Historical   Clenpiq 10 mg-3.5 gram -12 gram/160 mL soln as directed. Patient not taking: Reported on 2022 12/15/21   Provider, Historical   cholecalciferol (VITAMIN D3) (2,000 UNITS /50 MCG) cap capsule Take  by mouth daily. Provider, Historical   b complex vitamins tablet Take 1 Tablet by mouth daily. Provider, Historical   prenatal 63/IMKV fum/folic/dha (PRENATAL-1 PO) Take  by mouth. Provider, Historical       Food Allergies/Intolerances: vegetarian for 20 plus year, whole foods, oil free vegan,lactose sensitive    Anthropometrics:    Ht:70\"   Wt: 359#   IBW: 150#   %IBW: 239%     BMI:51    Category: obesity class 3    Pt presents today for initial nutrition consult for the [address].  Is interested in bariatric surgery but must attend 1 year of a formal weight loss plan/instructions. Being seen with us as well as a health /RD through her [facility]. Has attempted wt loss through various methods: Medifast 60# loss, weight watchers for 9 months through covid, logging food, not using all points, 2# loss per month. Then vegan, oil free only 7 pounds loss. October stopped this due to GI distress. Now at highest weight. Exercise/Physical Activity: 9762-0300 steps per day.     Started meal replacements: yes If yes, how many per day:2     Aversions/side effects to meal replacements: not reported    Reported Diet History:2 cups coffee per day, 1 packet of stevia, 1 sugar free dairy free creamer in each. 80+ oz water per day. Doesn't like soda or tea. Sipping, not gulping on water, as she is working on steps for bariatric surgery. Drinks plain water and Dmitry black raspberry. Herbal tea with packet of stevia. 1 diet soda once per week. 3 powders and a bar is the goal.      Environment/Psychosocial/Support:  VLCD program.  Telework at home with COVID    NUTRITION INTERVENTION:  Pt educated on nutrition recommendations for VLCD, specifically 4 packets every day totaling 800 calories, 40 grams carbs, 100+ grams protein. Grocery meal:  Use the balanced plate method to plan meals, include 3-6 oz of lean source of protein, unlimited non-starchy vegetables, 1/2 cup whole grains/beans OR 1/2 cup fruit OR 1 serving of low fat dairy. Utilize handouts listing healthy snack and meal ideas. Read all nutrition labels. Demonstrated and emphasized identifying serving size, total fat, sugar and protein content. Defined low fat as </= 3 g per serving. Discussed lean and extra lean sources of protein. Provided list of low fat cooking methods. Avoid foods with sugar listed in the first 3 ingredients and >/10 g sugar per serving. Practice mindful eating habits; take small bites, chew thoroughly, avoid distractions, utilize hunger/fullness scale. Attend Metabolic Weight Loss Class and Support Group and increase physical activity (approved per MD) for long term weight maintenance. NUTRITION MONITORING AND EVALUATION: interested in bariatric surgery, but has to wait a year. Pt plans to start VLCD today. Reviewed pt instructions, safe use, and general tip located in manual.  Pt motivated, adherence likely.     The following goals were established with patient;  1) ween slowly off caffeine, try 1/2 caf for a few weeks first, then transition slowly to decaf  2) 2-4 oz lean protein ok as transitioning to VLCD  3) keep carbs to no more than 50 grams per day total, including fiber carbs  4) 2 fiber packets,or 2 beverages with fiber, or 1 of each, or 2T metamucil/benefiber every day  5) 80+ oz fluid every day  6) light stroll 5 minutes twice a day until adjusted to VLCD, then meet with RD to adjust calories before starting exercise routine. VLCD and strenuous exercise is contraindicated. 7) followup PRN with RD    Specific tips and techniques to facilitate compliance with above recommendations were provided and discussed. If further details are desired please contact me at [phone]. This phone number was also provided to the patient for any further questions or concerns.           Edilia Alvarez MS, RD, LDN

## 2022-01-13 ENCOUNTER — OFFICE VISIT (OUTPATIENT)
Dept: SURGERY | Age: 52
End: 2022-01-13

## 2022-01-13 DIAGNOSIS — E66.01 CLASS 3 SEVERE OBESITY DUE TO EXCESS CALORIES WITH SERIOUS COMORBIDITY AND BODY MASS INDEX (BMI) OF 50.0 TO 59.9 IN ADULT (HCC): Primary | ICD-10-CM

## 2022-01-14 ENCOUNTER — DOCUMENTATION ONLY (OUTPATIENT)
Dept: SURGERY | Age: 52
End: 2022-01-14

## 2022-01-14 LAB
25(OH)D3+25(OH)D2 SERPL-MCNC: 18.7 NG/ML (ref 30–100)
ALBUMIN SERPL-MCNC: 4.4 G/DL (ref 3.8–4.9)
ALBUMIN/GLOB SERPL: 1.4 {RATIO} (ref 1.2–2.2)
ALP SERPL-CCNC: 75 IU/L (ref 44–121)
ALT SERPL-CCNC: 12 IU/L (ref 0–32)
ANA SER QL: NEGATIVE
AST SERPL-CCNC: 15 IU/L (ref 0–40)
B-HCG SERPL QL: NEGATIVE MIU/ML
BILIRUB SERPL-MCNC: 0.4 MG/DL (ref 0–1.2)
BUN SERPL-MCNC: 8 MG/DL (ref 6–24)
BUN/CREAT SERPL: 11 (ref 9–23)
CALCIUM SERPL-MCNC: 9.3 MG/DL (ref 8.7–10.2)
CHLORIDE SERPL-SCNC: 101 MMOL/L (ref 96–106)
CHOLEST SERPL-MCNC: 281 MG/DL (ref 100–199)
CO2 SERPL-SCNC: 24 MMOL/L (ref 20–29)
CREAT SERPL-MCNC: 0.72 MG/DL (ref 0.57–1)
EST. AVERAGE GLUCOSE BLD GHB EST-MCNC: 111 MG/DL
FOLATE SERPL-MCNC: 16.7 NG/ML
GLOBULIN SER CALC-MCNC: 3.1 G/DL (ref 1.5–4.5)
GLUCOSE SERPL-MCNC: 103 MG/DL (ref 65–99)
HBA1C MFR BLD: 5.5 % (ref 4.8–5.6)
HDL SERPL-SCNC: 38.9 UMOL/L
HDLC SERPL-MCNC: 72 MG/DL
INSULIN SERPL-ACNC: 28.7 UIU/ML (ref 2.6–24.9)
IRON SERPL-MCNC: 111 UG/DL (ref 27–159)
LDL SERPL QN: 21.3 NM
LDL SERPL-SCNC: 2042 NMOL/L
LDL SMALL SERPL-SCNC: 575 NMOL/L
LDLC SERPL CALC-MCNC: 192 MG/DL (ref 0–99)
LP-IR SCORE SERPL: 27
MAGNESIUM SERPL-MCNC: 2 MG/DL (ref 1.6–2.3)
POTASSIUM SERPL-SCNC: 4.4 MMOL/L (ref 3.5–5.2)
PROT SERPL-MCNC: 7.5 G/DL (ref 6–8.5)
RPR SER QL: NON REACTIVE
SODIUM SERPL-SCNC: 139 MMOL/L (ref 134–144)
THYROPEROXIDASE AB SERPL-ACNC: <8 IU/ML (ref 0–34)
TRIGL SERPL-MCNC: 98 MG/DL (ref 0–149)
TSH SERPL DL<=0.005 MIU/L-ACNC: 1.29 UIU/ML (ref 0.45–4.5)
URATE SERPL-MCNC: 5.3 MG/DL (ref 3–7.2)
VIT B12 SERPL-MCNC: 648 PG/ML (ref 232–1245)

## 2022-01-14 NOTE — PROGRESS NOTES
Clinic has received last lab and last office note from the [facility] office. Writer placed them on your desk for viewing.

## 2022-01-14 NOTE — PROGRESS NOTES
[facility]  Metabolic Weight Loss Program        Patient's Name: Sangeeta Valentin  : 1970    This patient is enrolled in [address] [place] [address] Weight Loss Program and attended the required weekly virtual nutrition class hosted via [address] [place] [address] today.       Fercho Collins, MS, RD, LDN

## 2022-01-19 ENCOUNTER — CLINICAL SUPPORT (OUTPATIENT)
Dept: SURGERY | Age: 52
End: 2022-01-19

## 2022-01-19 VITALS
OXYGEN SATURATION: 98 % | BODY MASS INDEX: 41.95 KG/M2 | RESPIRATION RATE: 18 BRPM | HEART RATE: 100 BPM | WEIGHT: 293 LBS | SYSTOLIC BLOOD PRESSURE: 155 MMHG | HEIGHT: 70 IN | DIASTOLIC BLOOD PRESSURE: 85 MMHG

## 2022-01-19 DIAGNOSIS — E66.01 MORBID OBESITY (HCC): Primary | ICD-10-CM

## 2022-01-19 NOTE — PROGRESS NOTES
Weight Management. 1. Have you been to the ER, urgent care clinic since your last visit? Hospitalized since your last visit? No    2. Have you seen or consulted any other health care providers outside of the [address] [place] [address] since your last visit? Include any pap smears or colon screening. No       Progress Note: Weekly Education Class in the [facility]         Patient is on Very Low Calorie Diet [x] (4 meal replacements per day, 800 kcal/day)      Low Calorie Diet [] (2-3 meal replacements per day, 7454-7835 kcal/day)    1) Did patient have any new symptoms or physical problems? Yes [x]    No []    If yes, check & comment: weakness [], fatigue [x], lightheadedness [], headache [x], cramps [], cold intolerance [], hair loss [], diarrhea [], constipation [],  NA [] other:                                 2) Has patient had any medical attention from other providers, urgent care or the emergency room this week? Yes []  No [x]       NA [], If yes, why:                                       3) Any other sugar sweetened beverages consumed this week? Yes []  No [x]    4) Did patient have any problems adhering to the diet? Yes []  No [x] NA []    If yes, Vacation [], Celebrations [], Conferences [], Family Reunions [] other:                                                5) How many hours of sleep this week? 6.5-8    (range)  NA []    Number of meal replacements consumed daily? 4 (range)  NA []    Average ounces of water patient consumed daily this week (not including shakes)? 80     (divide the weekly total by 7)    Did you eat any food outside of the program? Yes [x] No []    Physical Activity Over the Past Week:    Cardio exercise:0 min  Strength exercise: 0 workouts / week  Number of steps walked per day: 8541-2902    How has patient mood overall been this week?  Sad [], Happy [], Stressed [], Tired [], Content [], NA [], other   Bored           Medications reconciled by nurse Yes [x]  No[]    Patient was given therapeutic recommendations for any noted side effects of their dietary approach based upon [facility] patient manual per providers recommendation.

## 2022-01-20 ENCOUNTER — OFFICE VISIT (OUTPATIENT)
Dept: SURGERY | Age: 52
End: 2022-01-20

## 2022-01-20 DIAGNOSIS — E66.01 CLASS 3 SEVERE OBESITY DUE TO EXCESS CALORIES WITH SERIOUS COMORBIDITY AND BODY MASS INDEX (BMI) OF 50.0 TO 59.9 IN ADULT (HCC): Primary | ICD-10-CM

## 2022-01-21 NOTE — PROGRESS NOTES
[facility]  Metabolic Weight Loss Program        Patient's Name: Remigio Barrera  : 1970    This patient is enrolled in [address] [place] [address] Weight Loss Program and attended the required weekly virtual nutrition class hosted via [address] [place] [address].       Kennedy Conklin, MS, RD, LDN

## 2022-01-26 ENCOUNTER — CLINICAL SUPPORT (OUTPATIENT)
Dept: SURGERY | Age: 52
End: 2022-01-26

## 2022-01-26 ENCOUNTER — OFFICE VISIT (OUTPATIENT)
Dept: SURGERY | Age: 52
End: 2022-01-26

## 2022-01-26 VITALS
HEIGHT: 70 IN | TEMPERATURE: 98.3 F | HEART RATE: 105 BPM | OXYGEN SATURATION: 98 % | WEIGHT: 293 LBS | DIASTOLIC BLOOD PRESSURE: 83 MMHG | SYSTOLIC BLOOD PRESSURE: 160 MMHG | RESPIRATION RATE: 18 BRPM | BODY MASS INDEX: 41.95 KG/M2

## 2022-01-26 DIAGNOSIS — E66.01 MORBID OBESITY (HCC): Primary | ICD-10-CM

## 2022-01-26 DIAGNOSIS — E66.01 CLASS 3 SEVERE OBESITY DUE TO EXCESS CALORIES WITH SERIOUS COMORBIDITY AND BODY MASS INDEX (BMI) OF 50.0 TO 59.9 IN ADULT (HCC): Primary | ICD-10-CM

## 2022-01-26 NOTE — PROGRESS NOTES
Weight Management. 1. Have you been to the ER, urgent care clinic since your last visit? Hospitalized since your last visit? No    2. Have you seen or consulted any other health care providers outside of the [address] [place] [address] since your last visit? Include any pap smears or colon screening. No     BPs are normal at home (120's/80s)      Progress Note: Weekly Education Class in the [facility]         Patient is on Very Low Calorie Diet [x] (4 meal replacements per day, 800 kcal/day)      Low Calorie Diet [] (2-3 meal replacements per day, 1745-5778 kcal/day)    1) Did patient have any new symptoms or physical problems? Yes []    No [x]    If yes, check & comment: weakness [], fatigue [], lightheadedness [], headache [], cramps [], cold intolerance [], hair loss [], diarrhea [], constipation [],  NA [] other:                                 2) Has patient had any medical attention from other providers, urgent care or the emergency room this week? Yes []  No [x]       NA [], If yes, why:                                       3) Any other sugar sweetened beverages consumed this week? Yes []  No [x]    4) Did patient have any problems adhering to the diet? Yes []  No [x] NA []    If yes, Vacation [], Celebrations [], Conferences [], Family Reunions [] other:                                                5) How many hours of sleep this week? 4.75-7.25   (range)  NA []    Number of meal replacements consumed daily? 4  (range)  NA []    Average ounces of water patient consumed daily this week (not including shakes)? 131    (divide the weekly total by 7)    Did you eat any food outside of the program? Yes [x] No []    Physical Activity Over the Past Week:    Cardio exercise: 0 min  Strength exercise: 0 workouts / week  Number of steps walked per day: 9163-3340    How has patient mood overall been this week?  Sad [], Happy [], Stressed [], Tired [], Content [], NA [], other Good           Medications reconciled by nurse Yes [x]  No[]    Patient was given therapeutic recommendations for any noted side effects of their dietary approach based upon [facility] patient manual per providers recommendation.

## 2022-01-26 NOTE — PROGRESS NOTES
[facility]  Metabolic Program Follow-up Nutrition Consult    Date: 2022   Physician: Zulma Mclean DO  Name: Alyse Murillo  :  1970    Type of Plan: VLCD  Weeks on Plan: 2 weeks  Virtual visit was completed through [address] [place] [address]. ASSESSMENT:    Medications/Supplements:   Prior to Admission medications    Medication Sig Start Date End Date Taking? Authorizing Provider   omeprazole (PRILOSEC) 40 mg capsule Take 40 mg by mouth daily. Provider, Historical   Clenpiq 10 mg-3.5 gram -12 gram/160 mL soln as directed. Patient not taking: Reported on 2022 12/15/21   Provider, Historical   cholecalciferol (VITAMIN D3) (2,000 UNITS /50 MCG) cap capsule Take  by mouth daily. Provider, Historical   b complex vitamins tablet Take 1 Tablet by mouth daily. Provider, Historical   prenatal 22/KMNG fum/folic/dha (PRENATAL-1 PO) Take  by mouth. Provider, Historical              Starting Weight: 359#  Current Weight: 345#  Overall Pounds Lost: 14# Overall Pounds Gained: 0  Self report:     Exercise/Physical Activity: 1116-5665 steps. Interested in starting to weight train. Is patient using [facility] products:yes If yes, how many per day:4    Aversions/side effects of product/program: not reported    Fluids used to mix with products: water    Reported Diet History: started VLCD two weeks ago, a little hungry at times. Craving salt. Doing 4 meal replacements every day. Some outside food: 4 radishes, or 1 mini cucumber with Tyrone a day. Vegetarian options only, no animal proteins. Water per day 100-140 ounces last week. chuckie to help with hunger. Barriers/concerns preventing patient from achieving goal(s) since last visit:  doing program as well. Evening tv commercials trigger temptations but not giving in. Trying to avoid tv commercials. .    NUTRITION INTERVENTION:  Pt educated on nutrition recommendations for the [facility] Very Low Calorie Plan (VLCD), with the specific meal pattern of 4 meal replacements every day totaling 800kcals/every day and 40-50 grams carbohydrates/every day. Choose lean protein and non-starchy vegetable OR an additional meal replacement on days of increased activity/exercise to prevent injury, dizziness/lightheadedness, or delay in healing/repairing after exercise. Consult provider and RD prior to increase in exercise routine. IF choosing grocery meal/snack: Read all nutrition labels. Demonstrated and emphasized identifying serving size, total fat, sugar and protein content. Defined low fat as </= 3 g per serving. Discussed lean and extra lean sources of protein. Avoid foods with sugar listed in the first 3 ingredients and >/10 g sugar per serving. Consume meal replacements every three to four hours or pattern as discussed with provider. Mix with water. May add herbs/spices for taste. Refer to recipe book for meal replacements ideas to alter taste, texture, and temperature while keeping macronutrient distribution within program guidelines. Practice mindful eating habits; take small bites, chew thoroughly, avoid distractions, utilize hunger/fullness scale. Reviewed attendance policy of attending weekly nutrition classes. Metabolic support group recommended, and increase physical activity (approved per MD) for long term weight maintenance. NUTRITION MONITORING AND EVALUATION: pt doing very well as evidenced by 14# loss x 2 weeks. Small goals to assist with hunger, if needed. The following goals were established with patient;  1) tofu, edamame, or other plant based options as needed for hunger between shakes. Limit to 50 grams carbs total for the day.   Could also have additional half packet to one full packet of product   2) limit resistance training on VLCD, discuss with provider when ready to start this exercise regimen as calories and macros will need to be adjusted  3) ok to have a meal out to eat on vacation, limit to plant based protein and non-starchy vegetables. 4) great job on water! Specific tips and techniques to facilitate compliance with above recommendations were provided and discussed. If further details are desired please contact me at [phone]. This phone number was also provided to the patient for any further questions or concerns.           Madhu Wilhelm, MS, RD, LDN

## 2022-01-27 NOTE — PROGRESS NOTES
[facility]  Metabolic Weight Loss Program        Patient's Name: Wadell Severe  : 1970    This patient is enrolled in [address] [place] [address] Weight Loss Program and attended the required weekly virtual nutrition class hosted via [employer] today.       Albert Parikh, RD

## 2022-02-01 NOTE — PROGRESS NOTES
Dorian Valdez presents for weekly or bi-monthly evaluation of Obesity Body mass index is 49.96 kg/m². Visit Vitals  BP (!) 155/85 (BP 1 Location: Left upper arm, BP Patient Position: Sitting, BP Cuff Size: Adult long)   Pulse 100   Resp 18   Ht 5' 10\" (1.778 m)   Wt 348 lb 3.2 oz (157.9 kg)   LMP 01/10/2022   SpO2 98%   BMI 49.96 kg/m²       Wt Readings from Last 3 Encounters:   01/26/22 345 lb (156.5 kg)   01/19/22 348 lb 3.2 oz (157.9 kg)   01/11/22 (!) 359 lb 4.8 oz (163 kg)       1. Morbid obesity ([address]         I have reviewed and agree with nurse documentation of Weekly Education Class nurse progress note for [employer] [facility] [address] [place] [address] [place]). Nicole Beal is compliant with program requirements and may continue participation utilizing the [employer] meal replacements, as discussed. Patient has been advised to refer to the Levine, Susan. \[facility] Outlook.\"" Patient Manual and notify us if any side effects to this meal plan are present and/or persist.  Nicole Beal may continue the current dietary approach, care and follow up at the monthly office visit with the provider, as scheduled. Check your blood pressure twice weekly and keep a BP log. Notify your pcp if it consistently is above 140/90 or below 90/60. Take your blood pressure log to your next office visit w your pcp. Go to ER if bottom number (DBP) ever stays above 100 or you experience dizziness, headache, chest pain, extremity weakness, Shortness of breath, new swelling or other associated symptoms. Limit any salt, fats, caffeine, alcohol in your diet. Increase your water consumption and fiber consumption. Exercise 5 times weekly for 30 minutes daily, as tolerated. Continue your current medications and take them as prescribed. Continue current care and subspecialty follow up. Visit your eye doctor yearly to check your eyes blood pressure related changes.     Follow-up and Dispositions    · Return in about 1 week (around 1/26/2022). Documentation true and accepted by Cheng Bella.  Nimesh Tavares., AOBFP, Diplomate [employer]

## 2022-02-01 NOTE — PROGRESS NOTES
Ebb Jeffry Valdez presents for weekly or bi-monthly evaluation of Obesity Body mass index is 49.5 kg/m². Visit Vitals  BP (!) 160/83 (BP 1 Location: Right arm, BP Patient Position: Sitting, BP Cuff Size: Adult long)   Pulse (!) 105   Temp 98.3 °F (36.8 °C) (Oral)   Resp 18   Ht 5' 10\" (1.778 m)   Wt 345 lb (156.5 kg)   LMP 01/10/2022   SpO2 98%   BMI 49.50 kg/m²       Wt Readings from Last 3 Encounters:   01/26/22 345 lb (156.5 kg)   01/19/22 348 lb 3.2 oz (157.9 kg)   01/11/22 (!) 359 lb 4.8 oz (163 kg)       1. Morbid obesity ([address]         I have reviewed and agree with nurse documentation of Weekly Education Class nurse progress note for [facility] Weight [address] [facility] [place]). Karlene Warren is compliant with program requirements and may continue participation utilizing the [employer] meal replacements, as discussed. Patient has been advised to refer to the [facility] Patient Manual and notify us if any side effects to this meal plan are present and/or persist.  Karlene Warren may continue the current dietary approach, care and follow up at the monthly office visit with the provider, as scheduled. Check your blood pressure twice weekly and keep a BP log. Notify your pcp if it consistently is above 140/90 or below 90/60. Take your blood pressure log to your next office visit w your pcp. Go to ER if bottom number (DBP) ever stays above 100 or you experience dizziness, headache, chest pain, extremity weakness, Shortness of breath, new swelling or other associated symptoms. Limit any salt, fats, caffeine, alcohol in your diet. Increase your water consumption and fiber consumption. Exercise 5 times weekly for 30 minutes daily, as tolerated. Continue your current medications and take them as prescribed. Continue current care and subspecialty follow up. Visit your eye doctor yearly to check your eyes blood pressure related changes.     Follow-up and Dispositions    · Return in about 1 week (around 2/2/2022). Documentation true and accepted by Chantel Carmona.  Karina Locke., AOBFP, Diplomate [employer]

## 2022-02-03 ENCOUNTER — OFFICE VISIT (OUTPATIENT)
Dept: SURGERY | Age: 52
End: 2022-02-03

## 2022-02-03 DIAGNOSIS — E66.01 CLASS 3 SEVERE OBESITY DUE TO EXCESS CALORIES WITH SERIOUS COMORBIDITY AND BODY MASS INDEX (BMI) OF 50.0 TO 59.9 IN ADULT (HCC): Primary | ICD-10-CM

## 2022-02-04 ENCOUNTER — CLINICAL SUPPORT (OUTPATIENT)
Dept: SURGERY | Age: 52
End: 2022-02-04

## 2022-02-04 VITALS
OXYGEN SATURATION: 98 % | TEMPERATURE: 98.5 F | SYSTOLIC BLOOD PRESSURE: 138 MMHG | HEIGHT: 70 IN | HEART RATE: 96 BPM | BODY MASS INDEX: 41.95 KG/M2 | RESPIRATION RATE: 18 BRPM | DIASTOLIC BLOOD PRESSURE: 88 MMHG | WEIGHT: 293 LBS

## 2022-02-04 DIAGNOSIS — E66.01 CLASS 3 SEVERE OBESITY DUE TO EXCESS CALORIES WITH SERIOUS COMORBIDITY AND BODY MASS INDEX (BMI) OF 45.0 TO 49.9 IN ADULT (HCC): Primary | ICD-10-CM

## 2022-02-04 NOTE — PROGRESS NOTES
Weight Management. 1. Have you been to the ER, urgent care clinic since your last visit? Hospitalized since your last visit? No    2. Have you seen or consulted any other health care providers outside of the [address] [place] [address] since your last visit? Include any pap smears or colon screening. No       Progress Note: Weekly Education Class in the [facility]         Patient is on Very Low Calorie Diet [x] (4 meal replacements per day, 800 kcal/day)      Low Calorie Diet [] (2-3 meal replacements per day, 3677-8606 kcal/day)    1) Did patient have any new symptoms or physical problems? Yes []    No [x]    If yes, check & comment: weakness [], fatigue [], lightheadedness [], headache [], cramps [], cold intolerance [], hair loss [], diarrhea [], constipation [],  NA [] other:                                 2) Has patient had any medical attention from other providers, urgent care or the emergency room this week? Yes []  No [x]       NA [], If yes, why:                                      3) Any other sugar sweetened beverages consumed this week? Yes []  No [x]    4) Did patient have any problems adhering to the diet? Yes []  No [x] NA []    If yes, Vacation [], Celebrations [], Conferences [], Family Reunions [] other:                                                5) How many hours of sleep this week? 5-7    (range)  NA []    Number of meal replacements consumed daily? 4 (range)  NA []    Average ounces of water patient consumed daily this week (not including shakes)? 74     (divide the weekly total by 7)    Did you eat any food outside of the program? Yes [x] No []    Physical Activity Over the Past Week:    Cardio exercise: 0 min  Strength exercise: 0 workouts / week  Number of steps walked per day: 2994-9938    How has patient mood overall been this week?  Sad [], Happy [], Stressed [], Tired [], Content [], NA [], other OK           Medications reconciled by nurse Yes [x] No[]    Patient was given therapeutic recommendations for any noted side effects of their dietary approach based upon [employer] patient manual per providers recommendation.

## 2022-02-04 NOTE — PROGRESS NOTES
[facility]  Metabolic Weight Loss Program        Patient's Name: Carlos Schmidt  : 1970    This patient is enrolled in [address] [place] [address] Weight Loss Program and attended the required weekly virtual nutrition class hosted via [employer].       Ricardo Dc, MS, RD, LDN

## 2022-02-10 ENCOUNTER — OFFICE VISIT (OUTPATIENT)
Dept: SURGERY | Age: 52
End: 2022-02-10

## 2022-02-10 DIAGNOSIS — E66.01 CLASS 3 SEVERE OBESITY DUE TO EXCESS CALORIES WITH SERIOUS COMORBIDITY AND BODY MASS INDEX (BMI) OF 50.0 TO 59.9 IN ADULT (HCC): Primary | ICD-10-CM

## 2022-02-11 ENCOUNTER — VIRTUAL VISIT (OUTPATIENT)
Dept: SURGERY | Age: 52
End: 2022-02-11
Payer: COMMERCIAL

## 2022-02-11 DIAGNOSIS — K21.9 CHRONIC GERD: ICD-10-CM

## 2022-02-11 DIAGNOSIS — Z90.49 S/P LAPAROSCOPIC CHOLECYSTECTOMY: ICD-10-CM

## 2022-02-11 DIAGNOSIS — E78.00 HIGH CHOLESTEROL: ICD-10-CM

## 2022-02-11 DIAGNOSIS — R63.4 WEIGHT LOSS: ICD-10-CM

## 2022-02-11 DIAGNOSIS — E55.9 VITAMIN D DEFICIENCY: ICD-10-CM

## 2022-02-11 DIAGNOSIS — F43.22 ADJUSTMENT DISORDER WITH ANXIOUS MOOD: ICD-10-CM

## 2022-02-11 DIAGNOSIS — E88.81 INSULIN RESISTANCE: ICD-10-CM

## 2022-02-11 DIAGNOSIS — R03.0 ELEVATED BLOOD-PRESSURE READING, WITHOUT DIAGNOSIS OF HYPERTENSION: ICD-10-CM

## 2022-02-11 DIAGNOSIS — L65.9 HAIR LOSS: ICD-10-CM

## 2022-02-11 DIAGNOSIS — Z99.89 OSA ON CPAP: ICD-10-CM

## 2022-02-11 DIAGNOSIS — R00.0 INCREASED HEART RATE: ICD-10-CM

## 2022-02-11 DIAGNOSIS — I45.10 INCOMPLETE RIGHT BUNDLE BRANCH BLOCK (RBBB) DETERMINED BY ELECTROCARDIOGRAPHY: ICD-10-CM

## 2022-02-11 DIAGNOSIS — K59.09 OTHER CONSTIPATION: ICD-10-CM

## 2022-02-11 DIAGNOSIS — Z78.9 VEGAN DIET: ICD-10-CM

## 2022-02-11 DIAGNOSIS — E66.01 CLASS 3 SEVERE OBESITY DUE TO EXCESS CALORIES WITH SERIOUS COMORBIDITY AND BODY MASS INDEX (BMI) OF 45.0 TO 49.9 IN ADULT (HCC): Primary | ICD-10-CM

## 2022-02-11 DIAGNOSIS — D05.81 INTRADUCTAL CARCINOMA AND LOBULAR CARCINOMA IN SITU (LCIS) OF RIGHT BREAST: ICD-10-CM

## 2022-02-11 DIAGNOSIS — G47.33 OSA ON CPAP: ICD-10-CM

## 2022-02-11 PROCEDURE — 99215 OFFICE O/P EST HI 40 MIN: CPT | Performed by: FAMILY MEDICINE

## 2022-02-11 RX ORDER — METFORMIN HYDROCHLORIDE 500 MG/1
1000 TABLET, EXTENDED RELEASE ORAL 2 TIMES DAILY WITH MEALS
Qty: 120 TABLET | Refills: 2 | Status: SHIPPED | OUTPATIENT
Start: 2022-02-11 | End: 2022-03-13

## 2022-02-11 RX ORDER — ERGOCALCIFEROL 1.25 MG/1
50000 CAPSULE ORAL
Qty: 15 CAPSULE | Refills: 1 | Status: SHIPPED | OUTPATIENT
Start: 2022-02-11

## 2022-02-11 NOTE — PROGRESS NOTES
[address] [address].  [phone] o  [phone] f       [address]    Patient Name:  Lauro Damian, 1970  PCP:  Tien Valdovinos, Not On File, MD    Method of Delivery: Synchronous (real-time) audio-video technology  Platform:  [employer]  My location:  Home Office                Patient location:  home    Patient's preferred weight management approach:  Very Low Calorie Diet, VLCD (800 kcal/day). Number meal replacements consumed daily:  4    Lauro Damian is a 45 y.o. female with Obesity Class 3 There is no height or weight on file to calculate BMI. and associated health concerns. Patient presents today for Weight Management    Challenges adhering to the plan over the past month:  First 3 days I had some \"white knuckling\" moments bc I gave up caffeine at the same time. I figured out my triggers were watching food commercials so we prerecord and avoid the commercials. Sugar free gum makes me hungry faster so I avoid it. I avoid my triggers and do not focus on food. I travel to work very day. I now decompress by taking a hot shower instead of starting to cook. Patient goals for participation in weight management program:   Ultimate goal:  Lose at least 140 lbs  (weight of 219 lbs) - I prefer a moving target. I want to learn. My short term goal is to first get out of the 300 lbs and into the 200 lbs.   Second goal:  250 lbs     Anthropometric Measures Previously    Start Date:  01/11/22  Start Weight:  359 lbs 4.8 oz     BMI:  51.55 kg/m2 Neck circumference:  17.5 in    Waist circumference:  61.5 in Body fat percentage:  50.5 %      Anthropometric MeasuresToday  Today's Self Reported Weight 2/11/2022: 333.8 lbs 0 oz      Weight Metrics 2/4/2022 1/26/2022 1/19/2022 1/11/2022 1/11/2022 11/23/2021   Weight 341 lb 11.2 oz 345 lb 348 lb 3.2 oz - 359 lb 4.8 oz 356 lb 6.4 oz   Neck Circ (inches) - - - 17.5 - -   Waist Measure Inches - - - 61.5 - -   Body Fat % - - - 50.5 - -   BMI 49.03 kg/m2 49.5 kg/m2 49.96 kg/m2 - 51.55 kg/m2 51.14 kg/m2     Neck Circumference:  Acceptable range for M >16 inches, F>15 inches  Waist Circumference:  Acceptable range for M> 40 inches/102 cm, F > 35 inches, 88 cm  Body Fat: Acceptable range for M 18-24%, F 25-31%      Pounds loss since the last doctor appointment with our Center a month ago:  26 lbs  Total pounds loss since starting this therapeutic approach on start date: 26 lbs. Is patient satisfied with his/her progress since the last appointment: yes. I feel really empowered. I believe I can do this for 6 months now. I was super frustrated prior to starting this bc I was not seeing results and now I do. Factors contributing to weight change:  Identifying and avoiding my triggers - getting my mind right. Starting VLCD. SURGICAL HISTORY  Previous Weight Loss Surgery:  0     Past Surgical History:   Procedure Laterality Date    HX APPENDECTOMY  1985    HX BREAST BIOPSY Right 2017    due to LCIS. Dr. Boo Chol HX CHOLECYSTECTOMY  2009    HX COLONOSCOPY  2017    w polypectomy. due q 5 yrs. Dr. Sowmya Ch.  HX ENDOSCOPY  2017    due to severe GERD. Dr. Kerri Noel  Current weight loss medication:  0   Weight loss medication prescribed by our office:  0    Previous OTC weight loss medications, herbal remedies/supplements: OTC Ephreda (removed from market due to MI)  Previous prescription weight loss medication:  Rx Saxenda 3.0 mg SC daily w Dr. Kash Phan  Negative side effects: Vianey Leonard stopped working (I took it when [place] pandemic started).   I experienced constipation, severe abdominal pain causing me not to be able to stand straight.)  Pounds lost with use:  17 lbs  Factors contributing to weight re-gain:  Stopping Saxenda d/t GI symptoms and it not working, my diet    Outpatient Medications Marked as Taking for the 2/11/22 encounter (Virtual Visit) with Yoli Miranda DO   Medication Sig Dispense Refill    omeprazole (PRILOSEC) 40 mg capsule Take 40 mg by mouth daily.  cholecalciferol (VITAMIN D3) (2,000 UNITS /50 MCG) cap capsule Take  by mouth daily.  b complex vitamins tablet Take 1 Tablet by mouth daily.  prenatal 62/FSAD fum/folic/dha (PRENATAL-1 PO) Take  by mouth. Medications that cause weight gain:  Prilosec  Medications that cause weight loss:  0  Any changes to medications since last office visit with me:  0    No Known Allergies    EATING HABITS  Has patient met with the RD over the past month:  Joao Heredia RD  Nutritional changes made over the last month per recommendation of the RD:  I was drinking too much water so I reduced it. I reduced artiificial sweeteners bc it makes me crave sugar. I was craving salt so I increased water and it helped. Are hunger, cravings and appetite controlled:  yes  Negative Side Effects from meal replacements:  Constipation initially better since I increased my water. I get hungry at 4 pm so I have a can of [employer].    Does patient want to utilize [employer] meal replacements:  Yes  Typical Meals:       Breakfast: 10-10:30 ND hot chocolate      Lunch:  1:30-2p ND shake or bar      Dinner: 6p ND shake or pudding      Snacks: 9p ND shake or pudding or bar if not eaten earlier, cucumbers w Larwence Macdonald, radishes  Barriers to eating meals:  Wake up 6:30-7a, in bed 11p    DRINKING HABITS  Average amount of water consumed daily:  oz/day  (Goal 2 L or 67 oz daily, minimally)  Amount of caffeine consumed daily: 2 C decaf herbal tea w 1 pack Stevia   Sugar-sweetened beverages consumed daily (including alcohol, sodas, teas, juices, etc.): 0  Barriers preventing patient from drinking minimum 2L water/day:  0     Social History     Tobacco Use    Smoking status: Former Smoker     Packs/day: 1.00     Years: 10.00     Pack years: 10.00     Types: Cigarettes     Quit date: 2007     Years since quitting: 15.1    Smokeless tobacco: Never Used   Vaping Use    Vaping Use: Never used   Substance Use Topics    Alcohol use: Yes     Alcohol/week: 3.0 standard drinks     Types: 3 Glasses of wine per week     Comment: weekends    Drug use: Not Currently         SLEEP HABITS  Total hours of sleep nightly: 6.5-8 hours (Goal 7-9 hours/nightly) - I wake up earlier and have more energy now  Sleep Hx:  MAHI w CPAP by Dr. Wilver Mehta of [place]  Occupation:   (work from home)  Rx or OTC Sleep Aids:  0       Barriers to getting proper rest:  0     PHYSICAL ACTIVITY HISTORY  Type of physical activity:  Increasing my steps. Average number steps per day:  0516-9417 steps/day  Barriers limiting physical activity:  An old L Rotator cuff injury got irritated when I slipped on my icy deck. BEHAVIORAL HEALTH HISTORY  DEPRESSION SCREENING:    3 most recent PHQ Screens 2/4/2022   Little interest or pleasure in doing things Not at all   Feeling down, depressed, irritable, or hopeless Not at all   Total Score PHQ 2 0       Any history of mental health conditions (including Depression, Anxiety, Anorexia, Bulimia or Binge Eating disorder): 0  Treating provider and medication(s):  0  Any current major lifestyle changes or stressors:   I will be returning to work face to face and am concerned about no mask requirement. Job stress. My  is being considered for a promotion which might require us to relocate.    Is mental health condition controlled: yes    Mobile Apps used for meal planning, calorie counting, water consumption, sleep, or physical activity:  0     ASSOCIATED MEDICAL CONDITIONS  Past Medical History:   Diagnosis Date    Colon polyps     Constipation     Gallstone 2009    GERD (gastroesophageal reflux disease) 2017    Dr. 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 stage 0. [address] Dr. Amauri Nolan, breast surgeon    Morbid obesity ([facility] [address]     MAHI on CPAP     Dr. Yip Mercury Stool color black     Vitamin D deficiency        OB/GYN HISTORY (For Female Patients)  Social History     Substance and Sexual Activity   Sexual Activity Yes    Partners: Male    Birth control/protection: Condom     OB History        3    Para   1    Term   1            AB   2    Living   1       SAB   2    IAB        Ectopic        Molar        Multiple        Live Births   1               Menopause:  0  LMP:  No LMP recorded. 01/10/22  Are you pregnant or planning on becoming pregnant within 6 months:  no    Do you have upcoming travel in the next 6 weeks:  0. Patient will also notify the dietician for recommendations during travel. Immunization History   Administered Date(s) Administered    COVID-19, Pfizer Purple top, DILUTE for use, 12+ yrs, 30mcg/0.3mL dose 2021, 2021, 2021    Influenza Vaccine 2021       OTHER MEDICAL CARE SINCE LAST OFFICE VISIT  None. Recall:  I saw my oncologist and had my MRI for LCIS. Doing well.     I saw my pcp for annual exam.  Doing well. Need a new pcp.     I saw my GI for heart burn. Rxd Prilosec. It is helping.     I saw KATERINA Holden/ Dr. Earle Beckford for bariatric surgery. Planning testing for [facility]. ROS  Pt denies lack of focus, fatigue, feeling weak, headaches, dizziness, light headedness, nausea, vomiting, diarrhea, constipation, indigestion, rapid heart rate, SOB, low blood sugar, feeling cold, hair loss, rash, fluid retention, leg aches, difficulty sleeping, irritability, mood swings, or other associated symptoms. Review of Systems negative except as noted above in HPI.      HEALTH MAINTENANCE  A1c:  SEE RESULTS BELOW     Lipids:  SEE RESULTS BELOW  Depression Screening:  Performed during Initial Visit to [address] [place] [address]  Colonoscopy:  2017, if patient is over 49 yo  Mammogram:  ?, if female patient is over 37 yo  Annual Wellness Exam:  To be performed by PCP, when appropriate. PHYSICAL EXAMINATION  (Limited due to being a telehealth encounter)  Self-reported Vital Signs:  Patient-Reported Vitals 2/11/2022   Patient-Reported Weight 333.8   Patient-Reported Pulse 77   Patient-Reported Systolic  664   Patient-Reported Diastolic 73        Weight Metrics 2/4/2022 1/26/2022 1/19/2022 1/11/2022 1/11/2022 11/23/2021   Weight 341 lb 11.2 oz 345 lb 348 lb 3.2 oz - 359 lb 4.8 oz 356 lb 6.4 oz   Neck Circ (inches) - - - 17.5 - -   Waist Measure Inches - - - 61.5 - -   Body Fat % - - - 50.5 - -   BMI 49.03 kg/m2 49.5 kg/m2 49.96 kg/m2 - 51.55 kg/m2 51.14 kg/m2          General appearance - Well nourished. Well appearing. Well developed. No acute distress. Obese. Head - Normocephalic. Atraumatic. Eyes - Extraocular eye movements intact. Sclera anicteric. Ears - Hearing is grossly normal bilaterally. Nose - normal and patent. No discharge. No nasal flaring noted. Neck -   Midline trachea. No neck masses noted. No neck retractions noted. Chest - Unlabored respirations. Symmetrical chest.  No conversational dyspnea noted. Heart - normal rate. Neurological - awake, alert and oriented to person, place, and time and event. Cranial nerves II through XII intact. Clear speech. Musculoskeletal - Intact x 4 extremities. No pain with movement. Psychological -   normal behavior, dress and thought processes. Good insight. Good eye contact. Normal affect. Appropriate mood. Normal speech.     DATA REVIEWED    No results found for: WBC, WBCLT, HGBPOC, HGB, HGBP, HCTPOC, HCT, PHCT, RBCH, PLT, MCV, HGBEXT, HCTEXT, PLTEXT  Lab Results   Component Value Date/Time    Sodium 139 01/12/2022 09:30 AM    Potassium 4.4 01/12/2022 09:30 AM    Chloride 101 01/12/2022 09:30 AM    CO2 24 01/12/2022 09:30 AM    Glucose 103 (H) 01/12/2022 09:30 AM    BUN 8 01/12/2022 09:30 AM    Creatinine 0.72 01/12/2022 09:30 AM    BUN/Creatinine ratio 11 01/12/2022 09:30 AM    GFR est  01/12/2022 09:30 AM    GFR est non-AA 97 01/12/2022 09:30 AM    Calcium 9.3 01/12/2022 09:30 AM    Bilirubin, total 0.4 01/12/2022 09:30 AM    Alk.  phosphatase 75 01/12/2022 09:30 AM    Protein, total 7.5 01/12/2022 09:30 AM    Albumin 4.4 01/12/2022 09:30 AM    A-G Ratio 1.4 01/12/2022 09:30 AM    ALT (SGPT) 12 01/12/2022 09:30 AM    AST (SGOT) 15 01/12/2022 09:30 AM     Lab Results   Component Value Date/Time    Hemoglobin A1c 5.5 01/12/2022 09:30 AM     Lab Results   Component Value Date/Time    TSH 1.290 01/12/2022 09:30 AM     Lab Results   Component Value Date/Time    Uric acid 5.3 01/12/2022 09:30 AM     Magnesium   Date Value Ref Range Status   01/12/2022 2.0 1.6 - 2.3 mg/dL Final     Lab Results   Component Value Date/Time    Vitamin B12 648 01/12/2022 09:30 AM    Folate 16.7 01/12/2022 09:30 AM     Lab Results   Component Value Date/Time    VITAMIN D, 25-HYDROXY 18.7 (L) 01/12/2022 09:30 AM     Lab Results   Component Value Date/Time    Iron 111 01/12/2022 09:30 AM     Lab Results   Component Value Date/Time    Cholesterol, Total 281 (H) 01/12/2022 09:30 AM   )  Results for orders placed or performed in visit on 01/12/22   [facility] (WITHOUT GRAPH)     Status: Abnormal   Result Value Ref Range Status    LDL-P 2,042 (H) <1,000 nmol/L Final     Comment:                           Low                   < 1000                            Moderate         1000 - 1299                            Borderline-High  1300 - 1599                            High             1600 - 2000                            Very High             > 2000      LDL-C(NIH CALC) 192 (H) 0 - 99 mg/dL Final     Comment:                           Optimal               <  100                            Above optimal     100 -  129                            Borderline 130 -  159                            High              160 -  189                            Very high             >  189      HDL-C 72 >39 mg/dL Final    Triglycerides 98 0 - 149 mg/dL Final    Cholesterol, Total 281 (H) 100 - 199 mg/dL Final    HDL-P (Total) 38.9 >=30.5 umol/L Final    Small LDL-P 575 (H) <=527 nmol/L Final    LDL size 21.3 >20.5 nm Final     Comment:  ----------------------------------------------------------                   ** INTERPRETATIVE INFORMATION**                   PARTICLE CONCENTRATION AND SIZE                      <--Lower CVD Risk   Higher CVD Risk-->    LDL AND HDL PARTICLES   Percentile in Reference Population    HDL-P (total)        High     75th    50th    25th   Low                         >34.9    34.9    30.5    26.7   <26.7    Small LDL-P          Low      25th    50th    75th   High                         <117     117     527     839    >839    LDL Size   <-Large (Pattern A)->    <-Small (Pattern B)->                      23.0    20.6           20.5      19.0   ----------------------------------------------------------  Small LDL-P and LDL Size are associated with CVD risk, but not after  LDL-P is taken into account. LP-IR SCORE 27 <=45 Final     Comment: INSULIN RESISTANCE MARKER      <--Insulin Sensitive    Insulin Resistant-->             Percentile in Reference Population  Insulin Resistance Score  LP-IR Score   Low   25th   50th   75th   High                <27   27     45     63     >63  LP-IR Score is inaccurate if patient is non-fasting. The LP-IR score is a laboratory developed index that has been  associated with insulin resistance and diabetes risk and should be  used as one component of a physician's clinical assessment.       Narrative    Test(s) 670696-SIM-R; 384596-QXZ-D; 123476-Triglycerides; 943811-  Cholesterol, Total; 674751-VFZ-Z (Total); 884297-Small LDL-P; 622013-  LDL Size; 022868-YC-QD Score  was developed and its performance characteristics determined  by [facility]. It has not been cleared or approved by the Food  and Drug Administration. Performed at:  [facility] [facility]  [address]  [id number]  : Santos Robertson MD, Phone:  [phone]        EK22 Sinus Tachycardia  at 106 bpm, QTC interval  427 ms. Incomplete RBBB. No prolonged QTc noted. Acceptable QTC upper limits:  440 ms for Males, 460 ms for Females    ASSESSMENT     ICD-10-CM ICD-9-CM    1. Class 3 severe obesity due to excess calories with serious comorbidity and body mass index (BMI) of 45.0 to 49.9 in adult ([facility]  E66.01 278.01     Z68.42 V85.42    2. High cholesterol  E78.00 272.0     w elevated LDLP and sdLDLP   3. Insulin resistance  E88.81 277.7 metFORMIN ER (GLUCOPHAGE XR) 500 mg tablet   4. Elevated blood-pressure reading, without diagnosis of hypertension  A24.9 094.4 METABOLIC PANEL, COMPREHENSIVE    resolved off caffeine   5. 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 MAHI on CPAP  G47.33 327.23     Z99.89 V46.8    7. Vitamin D deficiency  E55.9 268.9 ergocalciferol (ERGOCALCIFEROL) 1,250 mcg (50,000 unit) capsule   8. Incomplete right bundle branch block (RBBB) determined by electrocardiography  I45.10 426.4    9. Increased heart rate  R00.0 785.0     resolved since off caffeine   10. Adjustment disorder with anxious mood  F43.22 309.24     stable with diet changes   11. Other constipation  K59.09 564.09     resolved off Saxenda   12. Weight loss  R63.4 783.21    13. Vegan diet  Z78.9 V49.89    14. Chronic GERD  K21.9 530.81     resolved   15. Hair loss  L65.9 704.00     due to Vegan diet vs other   16. S/P laparoscopic cholecystectomy  Z90.49 V45.89    17. BMI 45.0-49.9, adult [facility])  Z68.42 V85.42        We discussed the expected course, resolution and complications of the diagnosis(es) in detail.     WEIGHT MANAGEMENT PLAN    Chart was reviewed and updated during the office visit today.     Palma Byrne has fulfilled [facility] Direction program requirements this month by completing homework forms, attending educational classes, nurse triage visits and monthly provider appointments as agreed and remains in good standing. Reviewed and appreciate registered dietician note for nutritional counseling. Patient has attended all requirements of the program over the past month and is in good standing. Reviewed and appreciate weekly nurse visits and agree with documentation. Followed up on any patient concerns today. Emphasized the importance of the patient continuing care from current primary care provider and other specialists while participating in this weight management program.  Patient has full access to all our office notes, orders, lab results on [facility] and can provide them copies, if desired. Advised patient to follow up with pcp for any acute symptoms and Health Maintenance. Continue current medications as directed by prescribers. Medication(s) prescribed today as noted above and sent electronically to the pharmacy on file after discussing risks, benefits, costs, interactions, alternatives, contraindications and potential side effects:  Start Rx Metformin  mg I po q day, titrate up to 2 po BID, as tolerated per our discussion today. Start Rx vit D 50k po weekly x 3-6 mos. Identified and discussed medications patient is taking that can cause weight gain or weight loss as recorded on patient's medication list.    Advised patient not to stop any use without discussing with the prescribing provider. Ask prescribing providers to consider more weight neutral or weight negative options. Will monitor patient's weight and health with continued use. Supplement recommendations: Take OTC Magnesium 400 mg po at night prn sleep, muscle cramping, constipation. Take OTC vit B12 1000 mcg daily orally if taking Metformin or experiencing numbness and tingling.     Take OTC Fish Oil 1000 mg with EPA and -1,000 mg daily if experiencing dry skin, low HDL. Use with caution if history of heartburn exists. Increase fiber products (I.e. fiber tea, fiber shakes) or try OTC Fiber products (I.e. Benefiber, Metamucil, psyllium, etc) if experiencing constipation. Take OTC Gas-X and/or Lactaid with meal replacements, if lactose intolerance history exists or symptoms begin. Referred patient to [facility] Patient Manual for recommended antidotes, if any new symptoms or side effects have been experienced once dietary approach is initiated. Advised patient to notify our office if this occurs. Reviewed and discussed most recent lab results. Reviewed and discussed EKG results. - Will refer for cardiology evaluation if Incomplete RBBB is new. Pt will check w her pcp. Recheck pertinent labs monthly while participating in VLCD and using [facility] meal replacements, as discussed to identify electrolyte abnormalities and guide therapeutic approach. Will recheck EKG for every 50 pounds of weight loss, per [facility] protocol. An order form for pertinent labs was given to patient today. Patient was advised to have the labwork performed 1 week prior to the next monthly appointment with me. Labs ordered today will be reviewed in detail at the next office visit and time allowed for any questions regarding the results. Advised patient to sign up for [employer] and view lab results directly. Notify me by [facility] if any questions or concerns arise. Discussed the patient's BMI, anthropometric measures and goals with patient. Informed patient that weight loss goal of 5-10% in 6-12 months has shown significant improvement in obesity and its related health conditions including DM, heart disease, asthma.   A key study published in Arthritis & Rheumatism of Overweight and Obese Adults with OA found that losing 1 pound of weight resulted in 4 pounds of pressure being removed from the knees. Dietary Prescription:    Recommended meal plan:  [employer] Very Low Calorie Dietary approach   Make sure proper daily calories are consumed for each dietary approach: VLCD 800 kcal/day, 3-4 meal replacements daily. Encouraged patient to stay within the recommended amount of calories per day. Do not skip meals. Meals must be eaten within a 3-4 hour time period in order to prevent potential side effects, including low blood sugar. Schedule 1:1 visit with RD. Consume a minimum of 2 L (67 oz) of water daily up to half your body weight in ounces of water while utilizing this dietary approach. Drink the majority of water prior to supper to prevent nocturnal urination. Avoid sugar-sweetened beverages, including sodas, alcohol, juice. Limit use of water enhancers. May try water infusion recipes instead. Limit caffeine intake to prevent sleep interference, heart palpitations and dehydration from increased urination. Will allow 1 8 oz cup of black coffee or green tea (to stimulate release of Adiponectin and promote fat burning. ). Consume caffeine 6-12 hours before bedtime to prevent sleep disturbances. Referred patient to [employer] Patient Manual for recommended antidotes, if any new symptoms or side effects have been experienced once dietary approach is initiated. Advised patient to notify our office if this occurs. Exercise Prescription: Advised minimal, gradual increase and as tolerated especially while using VLCD. Emphasized importance of mild physical activity and reducing sedentary time. Advised patient to work with RD to ensure you have proper calories for your level of activity. If already exercising, reduce it to half the time and intensity while determining the level of tolerance for the amount of caloric intake. A goal of 30 minutes physical activity daily is recommended for health benefit and at least 60 minutes daily to prevent weight regain. During weight loss phase, no less than 75% of this time needs to be performing aerobic (i.e. Walking) activity with no more than 25% of the time performing resistance exercises (i.e. Weight lifting, strengthening, toning). During weight maintenance phase, 50% of the physical activity time needs to be spent performing aerobic activity with 50% of the total time performing resistance exercises. Behavior and Lifestyle Prescription:  Counseled patient on stressors, stress management and self-care approaches. Encouraged pt to seek formal counseling to further address stressors identified today. If already receiving formal counseling please continue these sessions routinely. Go to ER if any thoughts or attempts of suicide are experienced. Referred patient to work with a counselor for further evaluation and intervention. Patient expressed appreciation. Sleep Prescription: A goal of 7-9 hours nightly of uninterrupted sleep is recommended to turn off the Grehlin hormone to be released from the stomach and triggers appetite while promoting weight gain. Proper rest turns on Leptin hormone to be released from white adipose tissue and promotes weight loss. Discussed snoring, symptoms of Sleep Apnea and improvements with weight loss. Strongly encouraged compliance with CPAP, if Sleep Apnea has been diagnosed. Advised patient to follow up with sleep specialist for every 25 pounds lost to see if CPAP settings need to be adjusted.        Counseled patient on health topics:  Obesity, Insulin Resistance, VLCD dietary approaches, benefits, contraindications, possible side effects to these diets and how to prevent poor outcomes (including staying hydrated, limit physical exercise while transitioning into this program, avoid skipping meals, etc), weight loss goals, sleep hygiene, barriers to losing weight and keeping weight off, strategies to overcome habits or challenges to approve or continue adherence and stressors. Reminded patients who are female gender and of reproductive age that with weight loss, they may become more fertile. Recommended the use of condoms or some reliable form of contraception if pregnancy is not desired. Notify our office immediately if patient becomes pregnant. Immunizations noted. Influenza and Covid-19 vaccines recommended, if patient has not had it. Obesity may increase risk for severe illness and death from Covid-19 disease. Offered empathy, encouragement, legitimation, prayers, partnership to patient. Praised patient for successes. Patient was offered a choice/choices in the treatment plan today. Patient expressed understanding with the diagnoses and the plan and agrees with recommendations. Return in about 1 month (around 3/11/2022) for [place] VLCD, results, MEDICATION MANAGMENT. Total time:  42 mins spent in counseling, coordinating care, reviewing and discussing results, reviewing and discussing relevant provider communication, completing relevant documentation, and discussing treatment plans in reference to The primary encounter diagnosis was Class 3 severe obesity due to excess calories with serious comorbidity and body mass index (BMI) of 45.0 to 49.9 in adult [facility]). 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 Dionte Forde, was evaluated through a synchronous (real-time) audio-video encounter.  The patient (or guardian if applicable) is aware that this is a billable service, which includes applicable co-pays. This Virtual Visit was conducted with patient's (and/or legal guardian's) consent. The visit was conducted pursuant to the emergency declaration under the [address] [place] [address], [address] [place] [address] authority and the [employer] Act. Patient identification was verified, and a caregiver was present when appropriate. The patient was located in a state where the provider was licensed to provide care. THANK YOU Brittanycarlos, Not On File, MD FOR ALLOWING ME THE PRIVILEGE TO PARTICIPATE IN THE CARE OF OUR MUTUAL PATIENT, Ms. Valdez WITH RESPECT TO WEIGHT MANAGEMENT. Cliff Orellana DO, AOBRIAN, GEETA, SALTY    There are no Patient Instructions on file for this visit.

## 2022-02-11 NOTE — PROGRESS NOTES
[facility]  Metabolic Weight Loss Program        Patient's Name: Yaneth Yap  : 1970    This patient is enrolled in [address] [place] [address] Weight Loss Program and attended the required weekly virtual nutrition class hosted via [address] [place] [address] today.       Ar Horan MS, RD, LDN

## 2022-02-11 NOTE — PROGRESS NOTES
Weight Management. 1 month follow up. Virtual Visit. 1. Have you been to the ER, urgent care clinic since your last visit? Hospitalized since your last visit? No    2. Have you seen or consulted any other health care providers outside of the [address] [place] [address] since your last visit? Include any pap smears or colon screening.  No

## 2022-02-17 ENCOUNTER — OFFICE VISIT (OUTPATIENT)
Dept: SURGERY | Age: 52
End: 2022-02-17

## 2022-02-17 DIAGNOSIS — E66.01 CLASS 3 SEVERE OBESITY DUE TO EXCESS CALORIES WITH SERIOUS COMORBIDITY AND BODY MASS INDEX (BMI) OF 45.0 TO 49.9 IN ADULT (HCC): Primary | ICD-10-CM

## 2022-02-17 NOTE — PROGRESS NOTES
[facility]  Metabolic Weight Loss Program        Patient's Name: Karlene Warren  : 1970    This patient is enrolled in [address] [place] [address] Weight Loss Program and attended the required weekly virtual nutrition class hosted via [address] [place] [address] today.       Edson Navarro, MS, RD, LDN

## 2022-02-18 ENCOUNTER — CLINICAL SUPPORT (OUTPATIENT)
Dept: SURGERY | Age: 52
End: 2022-02-18

## 2022-02-18 VITALS
HEIGHT: 70 IN | TEMPERATURE: 98.3 F | RESPIRATION RATE: 18 BRPM | WEIGHT: 293 LBS | SYSTOLIC BLOOD PRESSURE: 124 MMHG | BODY MASS INDEX: 41.95 KG/M2 | DIASTOLIC BLOOD PRESSURE: 83 MMHG | HEART RATE: 97 BPM | OXYGEN SATURATION: 96 %

## 2022-02-18 DIAGNOSIS — E66.01 OBESITY, CLASS III, BMI 40-49.9 (MORBID OBESITY) (HCC): Primary | ICD-10-CM

## 2022-02-18 NOTE — PROGRESS NOTES
Weight Management. 1. Have you been to the ER, urgent care clinic since your last visit? Hospitalized since your last visit? No    2. Have you seen or consulted any other health care providers outside of the [address] [place] [address] since your last visit? Include any pap smears or colon screening. No       Progress Note: Weekly Education Class in the [facility] Loss Program         Patient is on Very Low Calorie Diet [x] (4 meal replacements per day, 800 kcal/day)      Low Calorie Diet [] (2-3 meal replacements per day, 0348-5741 kcal/day)    1) Did patient have any new symptoms or physical problems? Yes []    No [x]    If yes, check & comment: weakness [], fatigue [], lightheadedness [], headache [], cramps [], cold intolerance [], hair loss [], diarrhea [], constipation [],  NA [] other:                                 2) Has patient had any medical attention from other providers, urgent care or the emergency room this week? Yes []  No [x]       NA [], If yes, why:                                       3) Any other sugar sweetened beverages consumed this week? Yes []  No [x]    4) Did patient have any problems adhering to the diet? Yes []  No [x] NA []    If yes, Vacation [], Celebrations [], Conferences [], Family Reunions [] other:                                                5) How many hours of sleep this week? 6-8.5    (range)  NA []    Number of meal replacements consumed daily? 4 (range)  NA []    Average ounces of water patient consumed daily this week (not including shakes)? 79     (divide the weekly total by 7)    Did you eat any food outside of the program? Yes [x] No []    Physical Activity Over the Past Week:    Cardio exercise: 0 min  Strength exercise: 0 workouts / week  Number of steps walked per day: 3839-5963    How has patient mood overall been this week?  Sad [], Happy [], Stressed [], Tired [], Content [], NA [], other OK           Medications reconciled by nurse Yes [x] No[]    Patient was given therapeutic recommendations for any noted side effects of their dietary approach based upon [employer] patient manual per providers recommendation.

## 2022-02-22 ENCOUNTER — CLINICAL SUPPORT (OUTPATIENT)
Dept: SURGERY | Age: 52
End: 2022-02-22

## 2022-02-22 DIAGNOSIS — E66.01 CLASS 3 SEVERE OBESITY DUE TO EXCESS CALORIES WITH SERIOUS COMORBIDITY AND BODY MASS INDEX (BMI) OF 45.0 TO 49.9 IN ADULT (HCC): Primary | ICD-10-CM

## 2022-02-22 NOTE — PROGRESS NOTES
[facility]  Metabolic Program Follow-up Nutrition Consult    Date: 2022   Physician: Sophia Edmondson DO  Name: Danielle Whiting  :  1970    Type of Plan: VLCD  Weeks on Plan: 6 weeks  Virtual visit was completed through [employer]. ASSESSMENT:    Medications/Supplements:   Prior to Admission medications    Medication Sig Start Date End Date Taking? Authorizing Provider   metFORMIN ER (GLUCOPHAGE XR) 500 mg tablet Take 2 Tablets by mouth two (2) times daily (with meals) for 30 days. Start with 1 pill at bedtime then titrate up to 2 pills twice daily as tolerated  Indications: insulin resistance 2/11/22 3/13/22  Molly Zazueta R, DO   ergocalciferol (ERGOCALCIFEROL) 1,250 mcg (50,000 unit) capsule Take 1 Capsule by mouth every seven (7) days. Indications: low vitamin D levels 22   Molly COVINGTON, DO   omeprazole (PRILOSEC) 40 mg capsule Take 40 mg by mouth daily. Provider, Historical   Clenpiq 10 mg-3.5 gram -12 gram/160 mL soln as directed. Patient not taking: Reported on 2022 12/15/21   Provider, Historical   b complex vitamins tablet Take 1 Tablet by mouth daily. Provider, Historical   prenatal 96/LPQN fum/folic/dha (PRENATAL-1 PO) Take  by mouth. Provider, Historical              Starting Weight: 359#  Current Weight: 326# (weight last visit 345#)  Overall Pounds Lost: 33# Overall Pounds Gained: 0  Self report 327#    Exercise/Physical Activity: increased activity with maurisio and painting. No structured exercise. Is patient using [employer] products: yes  If yes, how many per day:4    Aversions/side effects of product/program: none    Fluids used to mix with products:water    Reported Diet History:started taking metformin. Drowsiness first week, but has resolved. No meat  Changing time of shakes because at 3 to 4 pm gets very hungry.     8am shake  Noon shake  Bar at 4pm  6-7pm shake  Getting hungry at 3pm - will have radishes and chuckie    Up for the day at 6:30am, to bed at 11:00am    Water going well. Still or sparking. Chuckie. Dayami Earl. Thomson lime. 80+ ounces per day    Barriers/concerns preventing patient from achieving goal(s) since last visit: feeling hungry    NUTRITION INTERVENTION:  Pt educated on nutrition recommendations for the [employer] Very Low Calorie Plan (VLCD), with the specific meal pattern of 4 meal replacements every day totaling 800kcals/every day and 40-50 grams carbohydrates/every day. Choose lean protein and non-starchy vegetable OR an additional meal replacement on days of increased activity/exercise to prevent injury, dizziness/lightheadedness, or delay in healing/repairing after exercise. Consult provider and RD prior to increase in exercise routine. IF choosing grocery meal/snack: Read all nutrition labels. Demonstrated and emphasized identifying serving size, total fat, sugar and protein content. Defined low fat as </= 3 g per serving. Discussed lean and extra lean sources of protein. Avoid foods with sugar listed in the first 3 ingredients and >/10 g sugar per serving. Consume meal replacements every three to four hours or pattern as discussed with provider. Mix with water. May add herbs/spices for taste. Refer to recipe book for meal replacements ideas to alter taste, texture, and temperature while keeping macronutrient distribution within program guidelines. Practice mindful eating habits; take small bites, chew thoroughly, avoid distractions, utilize hunger/fullness scale. Reviewed attendance policy of attending weekly nutrition classes. Metabolic support group recommended, and increase physical activity (approved per MD) for long term weight maintenance. NUTRITION MONITORING AND EVALUATION:  Doing well, 33# loss x 6 weeks. She is concerned this is too easy and once at goal, will regain quickly.   We discussed the maintenance phase to ween from VLCD and prevent regain,  is also doing program, which helps motivate. She has been hungry recently, likely due to increased movement with house renovations. Discussed increasing by a packet or protein on higher activity days to assist with hunger. Pt motivated, adherence likely. The following goals were established with patient;  1) reduce time between shakes to 3 hours from 4 hours  2) change bar to powder in the afternoon  3) add half packet in the evening if still hungry  4) move time to 9am instead of 8am, if needed to condense times later in the day  5) great job on water!  oz every day   6) if increased movement days, such as house renovations, ok to add extra packet        Specific tips and techniques to facilitate compliance with above recommendations were provided and discussed. If further details are desired please contact me at [phone]. This phone number was also provided to the patient for any further questions or concerns.           Fercho Collins, MS, RD, LDN

## 2022-02-24 ENCOUNTER — OFFICE VISIT (OUTPATIENT)
Dept: SURGERY | Age: 52
End: 2022-02-24

## 2022-02-24 DIAGNOSIS — E66.01 CLASS 3 SEVERE OBESITY DUE TO EXCESS CALORIES WITH SERIOUS COMORBIDITY AND BODY MASS INDEX (BMI) OF 45.0 TO 49.9 IN ADULT (HCC): Primary | ICD-10-CM

## 2022-02-25 ENCOUNTER — CLINICAL SUPPORT (OUTPATIENT)
Dept: SURGERY | Age: 52
End: 2022-02-25

## 2022-02-25 VITALS
SYSTOLIC BLOOD PRESSURE: 138 MMHG | HEIGHT: 70 IN | RESPIRATION RATE: 20 BRPM | BODY MASS INDEX: 41.95 KG/M2 | WEIGHT: 293 LBS | HEART RATE: 87 BPM | DIASTOLIC BLOOD PRESSURE: 88 MMHG | TEMPERATURE: 98.9 F | OXYGEN SATURATION: 100 %

## 2022-02-25 DIAGNOSIS — E66.01 OBESITY, CLASS III, BMI 40-49.9 (MORBID OBESITY) (HCC): Primary | ICD-10-CM

## 2022-02-25 NOTE — PROGRESS NOTES
1. Have you been to the ER, urgent care clinic since your last visit? Hospitalized since your last visit? No    2. Have you seen or consulted any other health care providers outside of the [address] [place] [address] since your last visit? Include any pap smears or colon screening. No         Progress Note: Weekly Education Class in the [facility]         Patient is on Very Low Calorie Diet [x] (4 meal replacements per day, 800 kcal/day)      Low Calorie Diet [] (2-3 meal replacements per day, 6892-0062 kcal/day)    1) Did patient have any new symptoms or physical problems? Yes []    No [x]    If yes, check & comment: weakness [], fatigue [], lightheadedness [], headache [], cramps [], cold intolerance [], hair loss [], diarrhea [], constipation [],  NA [] other:                                2) Has patient had any medical attention from other providers, urgent care or the emergency room this week? Yes []  No [x]       NA [], If yes, why:                                      3) Any other sugar sweetened beverages consumed this week? Yes []  No [x]    4) Did patient have any problems adhering to the diet? Yes []  No [x] NA []    If yes, Vacation [], Celebrations [], Conferences [], Family Reunions [] other:                                               5) How many hours of sleep this week? 6.5-8.5 (range)  NA []    Number of meal replacements consumed daily? 4 (range)  NA []    Average ounces of water patient consumed daily this week (not including shakes)? 73   (divide the weekly total by 7)    Did you eat any food outside of the program? Yes [x] No []    Physical Activity Over the Past Week:    Cardio exercise: n/a min  Strength exercise: n/a workouts / week  Number of steps walked per day: 1038-9574    How has patient mood overall been this week?  Sad [], Happy [], Stressed [], Tired [], Content [], NA [], other okay           Medications reconciled by nurse Yes [x]  No[]    Patient was given therapeutic recommendations for any noted side effects of their dietary approach based upon [employer] patient manual per providers recommendation.

## 2022-03-01 NOTE — PROGRESS NOTES
Dreama Nageotte Everetts presents for weekly or bi-monthly evaluation of Obesity Body mass index is 49.03 kg/m². Visit Vitals  /88 (BP 1 Location: Right arm, BP Patient Position: Sitting, BP Cuff Size: Adult long)   Pulse 96   Temp 98.5 °F (36.9 °C) (Oral)   Resp 18   Ht 5' 10\" (1.778 m)   Wt 341 lb 11.2 oz (155 kg)   LMP 01/10/2022   SpO2 98%   BMI 49.03 kg/m²       Wt Readings from Last 3 Encounters:   02/25/22 326 lb 14.4 oz (148.3 kg)   02/18/22 332 lb 6.4 oz (150.8 kg)   02/04/22 341 lb 11.2 oz (155 kg)       1. Class 3 severe obesity due to excess calories with serious comorbidity and body mass index (BMI) of 45.0 to 49.9 in adult [facility])         I have reviewed and agree with nurse documentation of Weekly Education Class nurse progress note for [employer] [facility] [address] [place] [address] [place]). Remigio Barrera is compliant with program requirements and may continue participation utilizing the [employer] meal replacements, as discussed. Patient has been advised to refer to the [facility] Patient Manual and notify us if any side effects to this meal plan are present and/or persist.  Remigio Barrera may continue the current dietary approach, care and follow up at the monthly office visit with the provider, as scheduled. Follow-up and Dispositions    · Return in about 1 week (around 2/11/2022). Documentation true and accepted by Mila Guidry.  Juan Chávez., AOBFP, Diplomate SALTY GILLETTE

## 2022-03-03 ENCOUNTER — OFFICE VISIT (OUTPATIENT)
Dept: SURGERY | Age: 52
End: 2022-03-03

## 2022-03-03 DIAGNOSIS — E66.01 CLASS 3 SEVERE OBESITY DUE TO EXCESS CALORIES WITH SERIOUS COMORBIDITY AND BODY MASS INDEX (BMI) OF 45.0 TO 49.9 IN ADULT (HCC): Primary | ICD-10-CM

## 2022-03-03 NOTE — PROGRESS NOTES
[facility]  Metabolic Weight Loss Program        Patient's Name: Remigio Barrera  : 1970    This patient is enrolled in [address] [place] [address] Weight Loss Program and attended the required weekly virtual nutrition class hosted via [employer].       Kennedy Conklin, MS, RD, LDN

## 2022-03-04 ENCOUNTER — CLINICAL SUPPORT (OUTPATIENT)
Dept: SURGERY | Age: 52
End: 2022-03-04

## 2022-03-04 VITALS
HEIGHT: 70 IN | WEIGHT: 293 LBS | DIASTOLIC BLOOD PRESSURE: 85 MMHG | TEMPERATURE: 98.2 F | BODY MASS INDEX: 41.95 KG/M2 | HEART RATE: 96 BPM | SYSTOLIC BLOOD PRESSURE: 135 MMHG | OXYGEN SATURATION: 98 %

## 2022-03-04 DIAGNOSIS — E66.01 OBESITY, CLASS III, BMI 40-49.9 (MORBID OBESITY) (HCC): Primary | ICD-10-CM

## 2022-03-04 NOTE — PROGRESS NOTES
[facility]  Metabolic Weight Loss Program        Patient's Name: Carlos cShmidt  : 1970    This patient is enrolled in [address] [place] [address] Weight Loss Program and attended the required weekly virtual nutrition class hosted via [employer].       Ricardo Dc, MS, RD, LDN

## 2022-03-04 NOTE — PROGRESS NOTES
Progress Note: Weekly Education Class in the [facility] Program         Patient is on Very Low Calorie Diet [x] (4 meal replacements per day, 800 kcal/day)      Low Calorie Diet [] (2-3 meal replacements per day, 0330-1717 kcal/day)    1) Did patient have any new symptoms or physical problems? Yes []    No [x]    If yes, check & comment: weakness [], fatigue [], lightheadedness [], headache [], cramps [], cold intolerance [], hair loss [], diarrhea [], constipation [],  NA [] other:                                 2) Has patient had any medical attention from other providers, urgent care or the emergency room this week? Yes []  No [x]       NA [], If yes, why:                                      3) Any other sugar sweetened beverages consumed this week? Yes []  No [x]    4) Did patient have any problems adhering to the diet? Yes []  No [x] NA []    If yes, Vacation [], Celebrations [], Conferences [], Family Reunions [] other:                                                5) How many hours of sleep this week? 6.5-8.5    (range)  NA []    Number of meal replacements consumed daily? 4 (range)  NA []    Average ounces of water patient consumed daily this week (not including shakes)? 77     (divide the weekly total by 7)    Did you eat any food outside of the program? Yes [x] No []    Physical Activity Over the Past Week:    Cardio exercise: 0 min  Strength exercise: 0 workouts / week  Number of steps walked per day: 7648-1374    How has patient mood overall been this week? Sad [], Happy [], Stressed [], Tired [], Content [], NA [], other ok           Medications reconciled by nurse Yes [x]  No[]    Patient was given therapeutic recommendations for any noted side effects of their dietary approach based upon [facility] patient manual per providers recommendation.

## 2022-03-04 NOTE — PROGRESS NOTES
Ebb Jeffry Valdez presents for weekly or bi-monthly evaluation of Obesity Body mass index is 46.91 kg/m². Visit Vitals  /88 (BP 1 Location: Left arm, BP Patient Position: Sitting, BP Cuff Size: Thigh)   Pulse 87   Temp 98.9 °F (37.2 °C)   Resp 20   Ht 5' 10\" (1.778 m)   Wt 326 lb 14.4 oz (148.3 kg)   SpO2 100%   BMI 46.91 kg/m²       Wt Readings from Last 3 Encounters:   03/04/22 321 lb 6.4 oz (145.8 kg)   02/25/22 326 lb 14.4 oz (148.3 kg)   02/18/22 332 lb 6.4 oz (150.8 kg)       1. Obesity, Class III, BMI 40-49.9 (morbid obesity) ([address]         I have reviewed and agree with nurse documentation of Weekly Education Class nurse progress note for [facility] [employer] [facility] [address] [place] [address] [place]). Karlene Warren is compliant with program requirements and may continue participation utilizing the [employer] meal replacements, as discussed. Patient has been advised to refer to the [facility] Patient Manual and notify us if any side effects to this meal plan are present and/or persist.  Kalrene Warren may continue the current dietary approach, care and follow up at the monthly office visit with the provider, as scheduled. Follow-up and Dispositions    · Return in about 1 week (around 3/4/2022) for weight check. Documentation true and accepted by Sandy Bartlett.  Soheila Raman, AOBFP, Diplomate SALTY GILLETTE

## 2022-03-04 NOTE — PROGRESS NOTES
1. Have you been to the ER, urgent care clinic since your last visit? Hospitalized since your last visit? No    2. Have you seen or consulted any other health care providers outside of the [address] [place] [address] since your last visit? Include any pap smears or colon screening.  No

## 2022-03-04 NOTE — PROGRESS NOTES
Chaparrita Valdez presents for weekly or bi-monthly evaluation of Obesity Body mass index is 47.69 kg/m². Visit Vitals  /83 (BP 1 Location: Right arm, BP Patient Position: Sitting, BP Cuff Size: Adult long)   Pulse 97   Temp 98.3 °F (36.8 °C) (Oral)   Resp 18   Ht 5' 10\" (1.778 m)   Wt 332 lb 6.4 oz (150.8 kg)   SpO2 96%   BMI 47.69 kg/m²       Wt Readings from Last 3 Encounters:   03/04/22 321 lb 6.4 oz (145.8 kg)   02/25/22 326 lb 14.4 oz (148.3 kg)   02/18/22 332 lb 6.4 oz (150.8 kg)       1. Obesity, Class III, BMI 40-49.9 (morbid obesity) ([address]         I have reviewed and agree with nurse documentation of Weekly Education Class nurse progress note for [facility] [address] [facility] [place]). Lidia Tran is compliant with program requirements and may continue participation utilizing the [employer] meal replacements, as discussed. Patient has been advised to refer to the [facility] Patient Manual and notify us if any side effects to this meal plan are present and/or persist.  Lidia Tran may continue the current dietary approach, care and follow up at the monthly office visit with the provider, as scheduled. Follow-up and Dispositions    · Return in about 1 week (around 2/25/2022). Documentation true and accepted by Manuel Oneill.  Meghan Johnson., AOBFP, Diplomate SALTY GILLETTE

## 2022-03-06 LAB
ALBUMIN SERPL-MCNC: 4.3 G/DL (ref 3.8–4.9)
ALBUMIN/GLOB SERPL: 1.5 {RATIO} (ref 1.2–2.2)
ALP SERPL-CCNC: 72 IU/L (ref 44–121)
ALT SERPL-CCNC: 40 IU/L (ref 0–32)
AST SERPL-CCNC: 23 IU/L (ref 0–40)
BILIRUB SERPL-MCNC: 0.4 MG/DL (ref 0–1.2)
BUN SERPL-MCNC: 11 MG/DL (ref 6–24)
BUN/CREAT SERPL: 15 (ref 9–23)
CALCIUM SERPL-MCNC: 9.3 MG/DL (ref 8.7–10.2)
CHLORIDE SERPL-SCNC: 101 MMOL/L (ref 96–106)
CO2 SERPL-SCNC: 21 MMOL/L (ref 20–29)
CREAT SERPL-MCNC: 0.72 MG/DL (ref 0.57–1)
EGFR: 101 ML/MIN/1.73
GLOBULIN SER CALC-MCNC: 2.9 G/DL (ref 1.5–4.5)
GLUCOSE SERPL-MCNC: 98 MG/DL (ref 65–99)
POTASSIUM SERPL-SCNC: 5.3 MMOL/L (ref 3.5–5.2)
PROT SERPL-MCNC: 7.2 G/DL (ref 6–8.5)
SODIUM SERPL-SCNC: 141 MMOL/L (ref 134–144)

## 2022-03-10 ENCOUNTER — OFFICE VISIT (OUTPATIENT)
Dept: SURGERY | Age: 52
End: 2022-03-10

## 2022-03-10 DIAGNOSIS — E66.01 CLASS 3 SEVERE OBESITY DUE TO EXCESS CALORIES WITH SERIOUS COMORBIDITY AND BODY MASS INDEX (BMI) OF 45.0 TO 49.9 IN ADULT (HCC): Primary | ICD-10-CM

## 2022-03-11 ENCOUNTER — CLINICAL SUPPORT (OUTPATIENT)
Dept: SURGERY | Age: 52
End: 2022-03-11

## 2022-03-11 VITALS
HEART RATE: 93 BPM | TEMPERATURE: 97.8 F | HEIGHT: 70 IN | OXYGEN SATURATION: 98 % | SYSTOLIC BLOOD PRESSURE: 137 MMHG | RESPIRATION RATE: 18 BRPM | BODY MASS INDEX: 41.95 KG/M2 | DIASTOLIC BLOOD PRESSURE: 82 MMHG | WEIGHT: 293 LBS

## 2022-03-11 DIAGNOSIS — E66.01 OBESITY, CLASS III, BMI 40-49.9 (MORBID OBESITY) (HCC): Primary | ICD-10-CM

## 2022-03-11 NOTE — PROGRESS NOTES
1. Have you been to the ER, urgent care clinic since your last visit? Hospitalized since your last visit? no    2. Have you seen or consulted any other health care providers outside of the [address] [place] [address] since your last visit? Include any pap smears or colon screening. No        Progress Note: Weekly Education Class in the [facility] Weight Loss Program         Patient is on Very Low Calorie Diet [] (4 meal replacements per day, 800 kcal/day) DATE 3/11/22      Low Calorie Diet [] (2-3 meal replacements per day, 7096-1771 kcal/day)    1) Did patient have any new symptoms or physical problems? Yes []    No [x]    If yes, check & comment: weakness [], fatigue [], lightheadedness [], headache [], cramps [], cold intolerance [], hair loss [], diarrhea [], constipation [],  NA [] other:                          2) Has patient had any medical attention from other providers, urgent care or the emergency room this week? Yes []  No [x]       NA [], If yes, why:                                      3) Any other sugar sweetened beverages consumed this week? Yes []  No [x]    4) Did patient have any problems adhering to the diet? Yes []  No [x] NA []    If yes, Vacation [], Celebrations [], Conferences [], Family Reunions [] other:                                               5) How many hours of sleep this week?4.5-6.5    (range)  NA []    Number of meal replacements consumed daily? 3-4 (range)  NA []    Average ounces of water patient consumed daily this week (not including shakes)? 62     (divide the weekly total by 7)    Did you eat any food outside of the program? Yes [x] No []    Physical Activity Over the Past Week:    Cardio exercise: 0 min  Strength exercise: 0 workouts / week  Number of steps walked per JAD:1128-4041    How has patient mood overall been this week?  Sad [], Happy [], Stressed [x], Tired [], Content [], NA [], other           Medications reconciled by nurse Yes [x]  No[]    Patient was given therapeutic recommendations for any noted side effects of their dietary approach based upon [employer] patient manual per providers recommendation.

## 2022-03-16 ENCOUNTER — VIRTUAL VISIT (OUTPATIENT)
Dept: SURGERY | Age: 52
End: 2022-03-16
Payer: COMMERCIAL

## 2022-03-16 DIAGNOSIS — N92.6 IRREGULAR MENSES: ICD-10-CM

## 2022-03-16 DIAGNOSIS — E66.01 CLASS 3 SEVERE OBESITY DUE TO EXCESS CALORIES WITH SERIOUS COMORBIDITY AND BODY MASS INDEX (BMI) OF 45.0 TO 49.9 IN ADULT (HCC): Primary | ICD-10-CM

## 2022-03-16 DIAGNOSIS — G47.33 OSA ON CPAP: ICD-10-CM

## 2022-03-16 DIAGNOSIS — K59.09 OTHER CONSTIPATION: ICD-10-CM

## 2022-03-16 DIAGNOSIS — Z99.89 OSA ON CPAP: ICD-10-CM

## 2022-03-16 DIAGNOSIS — Z90.49 S/P LAPAROSCOPIC CHOLECYSTECTOMY: ICD-10-CM

## 2022-03-16 DIAGNOSIS — K21.9 CHRONIC GERD: ICD-10-CM

## 2022-03-16 DIAGNOSIS — E78.00 HIGH CHOLESTEROL: ICD-10-CM

## 2022-03-16 DIAGNOSIS — E55.9 VITAMIN D DEFICIENCY: ICD-10-CM

## 2022-03-16 DIAGNOSIS — Z78.9 VEGAN DIET: ICD-10-CM

## 2022-03-16 DIAGNOSIS — E87.5 HYPERKALEMIA: ICD-10-CM

## 2022-03-16 DIAGNOSIS — E88.81 INSULIN RESISTANCE: ICD-10-CM

## 2022-03-16 DIAGNOSIS — F43.22 ADJUSTMENT DISORDER WITH ANXIOUS MOOD: ICD-10-CM

## 2022-03-16 DIAGNOSIS — L65.9 HAIR LOSS: ICD-10-CM

## 2022-03-16 DIAGNOSIS — I45.10 INCOMPLETE RIGHT BUNDLE BRANCH BLOCK (RBBB) DETERMINED BY ELECTROCARDIOGRAPHY: ICD-10-CM

## 2022-03-16 PROCEDURE — 99215 OFFICE O/P EST HI 40 MIN: CPT | Performed by: FAMILY MEDICINE

## 2022-03-16 RX ORDER — METFORMIN HYDROCHLORIDE 500 MG/1
TABLET, EXTENDED RELEASE ORAL 2 TIMES DAILY
COMMUNITY
Start: 2022-03-15 | End: 2022-03-16 | Stop reason: SDUPTHER

## 2022-03-16 RX ORDER — METFORMIN HYDROCHLORIDE 500 MG/1
1000 TABLET, EXTENDED RELEASE ORAL 2 TIMES DAILY
Qty: 120 TABLET | Refills: 2 | Status: SHIPPED | OUTPATIENT
Start: 2022-03-16 | End: 2022-04-07 | Stop reason: DRUGHIGH

## 2022-03-16 NOTE — PROGRESS NOTES
[address] [address], [address] [place] [address] 22.  [phone] o  [phone] f       [address]    Patient Name:  January Hoffman, 1970  PCP:  Sandy Urena, Not On File, [facility]    Method of Delivery: Synchronous (real-time) audio-video technology  Platform:  [employer]  My location:  Home Office                Patient location:  [facility]    Patient's preferred weight management approach:  Very Low Calorie Diet, VLCD (800 kcal/day). Number meal replacements consumed daily:  4  Current Weight Loss Medication:  Metformin 500 mg XR I po BID, tolerating well. January Hoffman is a 45 y.o. female with Obesity Class 3 There is no height or weight on file to calculate BMI. and associated health concerns. Patient presents today for Weight Management, Medication Management, and Labs (review)    Challenges adhering to the plan over the past month:  Not been sleeping as well. So my days are longer. A lot going on w my  getting a new job in [place], [place] causing us to relocate there in the past 4 weeks. My job is busy as I work from home. Harder to keep track of basic things like drinking water. Patient goals for participation in weight management program:   Ultimate goal:  Lose at least 140 lbs  (weight of 219 lbs) - I prefer a moving target.  I want to learn. My short term goal is to first get out of the 300 lbs and into the 200 lbs.   Second goal:  250 lbs      Anthropometric Measures Previously    Start Date:  01/11/22  Start Weight:  359 lbs 4.8 oz                BMI:  51.55 kg/m2      Neck circumference:  17.5 in                Anthropometric MeasuresToday  Today's Self Reported Weight 3/16/2022: 318 lbs 0 oz      Weight Metrics 3/11/2022 3/4/2022 2/25/2022 2/18/2022 2/4/2022 1/26/2022 1/19/2022   Weight 321 lb 3.2 oz 321 lb 6.4 oz 326 lb 14.4 oz 332 lb 6.4 oz 341 lb 11.2 oz 345 lb 348 lb 3.2 oz   Neck Circ (inches) - - - - - - -   Waist Measure Inches - - - - - - -   Body Fat % - - - - - - -   BMI 46.09 kg/m2 46.12 kg/m2 46.91 kg/m2 47.69 kg/m2 49.03 kg/m2 49.5 kg/m2 49.96 kg/m2     Neck Circumference:  Acceptable range for M >16 inches, F>15 inches  Waist Circumference:  Acceptable range for M> 40 inches/102 cm, F > 35 inches, 88 cm  Body Fat: Acceptable range for M 18-24%, F 25-31%      Weight at last appointment on 02/11/22:  333.8 lbs 0 oz      Pounds loss since the last doctor appointment with our Center a month ago:  15.8 lbs  Total pounds loss since starting this therapeutic approach on start date: 41 lbs. Is patient satisfied with his/her progress since the last appointment: Yes and I am super happy with it! This past week has been slow bc of not drinking as much water, not eating as much food, my menses starting. I am worried about stopping this when we move. We are trying to get in the program in [place] and are on the waiting list.  Factors contributing to weight change: This program.  Focusing. Being mindful of my triggers for stress eating. I eat radishes when I need crunch. SURGICAL HISTORY  Previous Weight Loss Surgery:  0     Past Surgical History:   Procedure Laterality Date    HX APPENDECTOMY  1985    HX BREAST BIOPSY Right 2017    due to LCIS. Dr. Rukhsana Castillo HX CHOLECYSTECTOMY  2009    HX COLONOSCOPY  2017    w polypectomy. due q 5 yrs. Dr. Jesica Bang.  HX ENDOSCOPY  2017    due to severe GERD. Dr. Yon Leggett  Current weight loss medication:  Metformin 500 mg I po BID, tolerating well. Started 02/2022. Weight loss medication prescribed by our office:  As above.     Negative side effects: 0  Are hunger, cravings and appetite controlled with use of the weight loss medication:   Yes  Is the medication tolerated well:  Yes  Does patient desire refills of any medications previously prescribed by our office:  Yes and 90 day supply. Home Medications    Medication Sig Start Date End Date Taking? Authorizing Provider   metFORMIN ER (GLUCOPHAGE XR) 500 mg tablet Take 2 Tablets by mouth two (2) times a day. Indications: prevention of type 2 diabetes mellitus 3/16/22  Yes Rc Salinas DO   ergocalciferol (ERGOCALCIFEROL) 1,250 mcg (50,000 unit) capsule Take 1 Capsule by mouth every seven (7) days. Indications: low vitamin D levels 2/11/22  Yes Rc Salinas DO   b complex vitamins tablet Take 1 Tablet by mouth daily. Yes Provider, Historical   prenatal 57/MYCZ fum/folic/dha (PRENATAL-1 PO) Take  by mouth. Yes Provider, Historical     Medications that cause weight gain:  0  Medications that cause weight loss:  metformin  Any changes to medications since last office visit with me:  Started Metformin last month    Allergies   Allergen Reactions    Saxenda [Liraglutide (Weight Loss)] Other (comments)     3.0 MG  SEVERE ABDOMINAL PAIN - UNABLE TO STAND  CONSTIPATION       EATING HABITS  Has patient met with the RD over the past month:  Yes, Nahid [place]  Nutritional changes made over the last month per recommendation of the RD:  She helped me add a healthy snack in the afternoon. Because I am vegetarian, she did not have many suggestions. She recommended adding a 1/2 pack of shake for protein. Try a vegan tuna called Good Catch.      Are hunger, cravings and appetite controlled:  Yes  Negative Side Effects from meal replacements:  0   Does patient want to utilize [facility] Direction meal replacements:  Yes  Barriers to eating meals:  0    DRINKING HABITS  Average amount of water consumed daily:  oz/day  (Goal 2 L or 67 oz daily, minimally)  Amount of caffeine consumed daily: 0 oz 0   Sugar-sweetened beverages consumed daily (including alcohol, sodas, teas, juices, etc.): 2 C decaf herbal tea w 1/2 pack Stevia (instead of 1 pack)  Barriers preventing patient from drinking minimum 2L water/day:  Being on the road looking for a new home in [place] all day and not being able to stop and use the restroom. Busy around the house and forgetting to drink water. Social History     Tobacco Use    Smoking status: Former Smoker     Packs/day: 1.00     Years: 10.00     Pack years: 10.00     Types: Cigarettes     Quit date: 2007     Years since quitting: 15.2    Smokeless tobacco: Never Used   Vaping Use    Vaping Use: Never used   Substance Use Topics    Alcohol use: Yes     Alcohol/week: 3.0 standard drinks     Types: 3 Glasses of wine per week     Comment: weekends    Drug use: Not Currently         SLEEP HABITS  Total hours of sleep nightly: 5.5-6 hours (Goal 7-9 hours/nightly)  Rx or OTC Sleep Aids:  0  Daytime Sleepiness:  no      Sleep Hx:  Sleep Hx:  MAHI w CPAP by Dr. Neel Pradhan of [place]  Occupation:   (work from home)   Barriers to getting proper rest:  My thoughts. PHYSICAL ACTIVITY HISTORY  Type of physical activity:  0 except painting the house  Barriers limiting physical activity:  Time. BEHAVIORAL HEALTH HISTORY  DEPRESSION SCREENING:    3 most recent PHQ Screens 3/16/2022   Little interest or pleasure in doing things Not at all   Feeling down, depressed, irritable, or hopeless Not at all   Total Score PHQ 2 0       Any history of mental health conditions (including Depression, Anxiety, Anorexia, Bulimia or Binge Eating disorder): 0  Treating provider and medication(s):  0  Any current major lifestyle changes or stressors:   Work. Moving to [place]. Possibly leaving this program to start in [place]. Is mental health condition controlled: Yes. No SI/SA.     Mobile Apps used for meal planning, calorie counting, water consumption, sleep, or physical activity:  Yes     ASSOCIATED MEDICAL CONDITIONS  Past Medical History:   Diagnosis Date    Colon polyps     Constipation     Gallstone 2009    GERD (gastroesophageal reflux disease) 2017    Dr. Kaveh Lopez    Hair loss     IBS (irritable bowel syndrome)     Insulin resistance     Intraductal carcinoma and lobular carcinoma in situ (LCIS) 2017    (pre-cancerous). stage 0. [address] Dr. Cherri Coyne, breast surgeon    Morbid obesity ([facility] [address]     MAHI on CPAP     Dr. Jovanni Siegel Stool color black     Vitamin D deficiency        OB/GYN HISTORY (For Female Patients)  Social History     Substance and Sexual Activity   Sexual Activity Yes    Partners: Male    Birth control/protection: Condom     OB History        3    Para   1    Term   1            AB   2    Living   1       SAB   2    IAB        Ectopic        Molar        Multiple        Live Births   1               Menopause:  0  LMP:  No LMP recorded. LMP 22  Are you pregnant or planning on becoming pregnant within 6 months:  No    Do you have upcoming travel in the next 6 weeks:  Yes and moving to [place]. Patient will also notify the dietician for recommendations during travel. Immunization History   Administered Date(s) Administered    COVID-19, Pfizer Purple top, DILUTE for use, 12+ yrs, 30mcg/0.3mL dose 2021, 2021, 2021    Influenza Vaccine 2021       OTHER MEDICAL CARE SINCE LAST OFFICE VISIT  My eye doctor dxd w L Cataract. Rxd eye glasses. No surgery x 1 yr. ROS  Pt denies lack of focus, fatigue, feeling weak, headaches, dizziness, light headedness, nausea, vomiting, diarrhea, indigestion, rapid heart rate, SOB, low blood sugar, feeling cold, hair loss, rash, fluid retention, leg aches, irritability, mood swings, or other associated symptoms. Review of Systems negative except as noted above in HPI.      HEALTH MAINTENANCE  Health Maintenance   Topic Date Due    Hepatitis C Screening  Never done    DTaP/Tdap/Td series (1 - Tdap) Never done    Cervical cancer screen  Never done    Breast Cancer Screen Mammogram  Never done    Colorectal Cancer Screening Combo  Never done    Shingrix Vaccine Age 50> (1 of 2) Never done    Depression Screen  03/16/2023    Lipid Screen  01/12/2027    Flu Vaccine  Completed    COVID-19 Vaccine  Completed    Pneumococcal 0-64 years  Aged Out       PHYSICAL EXAMINATION  (Limited due to being a telehealth encounter)  Self-reported Vital Signs:  Patient-Reported Vitals 3/16/2022   Patient-Reported Weight 318.2   Patient-Reported Pulse 74   Patient-Reported Systolic  282   Patient-Reported Diastolic 76   Patient-Reported LMP 02/13/2022        Weight Metrics 3/11/2022 3/4/2022 2/25/2022 2/18/2022 2/4/2022 1/26/2022 1/19/2022   Weight 321 lb 3.2 oz 321 lb 6.4 oz 326 lb 14.4 oz 332 lb 6.4 oz 341 lb 11.2 oz 345 lb 348 lb 3.2 oz   Neck Circ (inches) - - - - - - -   Waist Measure Inches - - - - - - -   Body Fat % - - - - - - -   BMI 46.09 kg/m2 46.12 kg/m2 46.91 kg/m2 47.69 kg/m2 49.03 kg/m2 49.5 kg/m2 49.96 kg/m2          General appearance - Well nourished. Well appearing. Well developed. No acute distress. Obese. Head - Normocephalic. Atraumatic. Eyes - Extraocular eye movements intact. Sclera anicteric. Ears - Hearing is grossly normal bilaterally. Nose - normal and patent. No discharge. No nasal flaring noted. Neck -   Midline trachea. No neck masses noted. No neck retractions noted. Chest - Unlabored respirations. Symmetrical chest.  No conversational dyspnea noted. Heart - normal rate. Neurological - awake, alert and oriented to person, place, and time and event. Cranial nerves II through XII intact. Clear speech. Musculoskeletal - Intact x 4 extremities. No pain with movement. Psychological -   normal behavior, dress and thought processes. Good insight. Good eye contact. Normal affect. Appropriate mood. Normal speech.     DATA REVIEWED    No results found for: WBC, WBCLT, HGBPOC, HGB, HGBP, HCTPOC, HCT, PHCT, RBCH, PLT, MCV, HGBEXT, HCTEXT, PLTEXT  Lab Results   Component Value Date/Time    Sodium 141 03/05/2022 07:53 AM    Potassium 5.3 (H) 03/05/2022 07:53 AM    Chloride 101 03/05/2022 07:53 AM    CO2 21 03/05/2022 07:53 AM    Glucose 98 03/05/2022 07:53 AM    BUN 11 03/05/2022 07:53 AM    Creatinine 0.72 03/05/2022 07:53 AM    BUN/Creatinine ratio 15 03/05/2022 07:53 AM    GFR est  01/12/2022 09:30 AM    GFR est non-AA 97 01/12/2022 09:30 AM    Calcium 9.3 03/05/2022 07:53 AM    Bilirubin, total 0.4 03/05/2022 07:53 AM    Alk.  phosphatase 72 03/05/2022 07:53 AM    Protein, total 7.2 03/05/2022 07:53 AM    Albumin 4.3 03/05/2022 07:53 AM    A-G Ratio 1.5 03/05/2022 07:53 AM    ALT (SGPT) 40 (H) 03/05/2022 07:53 AM    AST (SGOT) 23 03/05/2022 07:53 AM     Lab Results   Component Value Date/Time    Hemoglobin A1c 5.5 01/12/2022 09:30 AM     Lab Results   Component Value Date/Time    TSH 1.290 01/12/2022 09:30 AM     Lab Results   Component Value Date/Time    Uric acid 5.3 01/12/2022 09:30 AM     Magnesium   Date Value Ref Range Status   01/12/2022 2.0 1.6 - 2.3 mg/dL Final     Lab Results   Component Value Date/Time    Vitamin B12 648 01/12/2022 09:30 AM    Folate 16.7 01/12/2022 09:30 AM     Lab Results   Component Value Date/Time    VITAMIN D, 25-HYDROXY 18.7 (L) 01/12/2022 09:30 AM     Lab Results   Component Value Date/Time    Iron 111 01/12/2022 09:30 AM     Lab Results   Component Value Date/Time    Cholesterol, Total 281 (H) 01/12/2022 09:30 AM   )  Results for orders placed or performed in visit on 01/12/22   [facility] (WITHOUT GRAPH)     Status: Abnormal   Result Value Ref Range Status    LDL-P 2,042 (H) <1,000 nmol/L Final     Comment:                           Low                   < 1000                            Moderate         1000 - 1299                            Borderline-High  1300 - 1599                            High             1600 - 2000                            Very High             > 2000      LDL-C(NIH CALC) 192 (H) 0 - 99 mg/dL Final     Comment:                           Optimal               < 100                            Above optimal     100 -  129                            Borderline        130 -  159                            High              160 -  189                            Very high             >  189      HDL-C 72 >39 mg/dL Final    Triglycerides 98 0 - 149 mg/dL Final    Cholesterol, Total 281 (H) 100 - 199 mg/dL Final    HDL-P (Total) 38.9 >=30.5 umol/L Final    Small LDL-P 575 (H) <=527 nmol/L Final    LDL size 21.3 >20.5 nm Final     Comment:  ----------------------------------------------------------                   ** INTERPRETATIVE INFORMATION**                   PARTICLE CONCENTRATION AND SIZE                      <--Lower CVD Risk   Higher CVD Risk-->    LDL AND HDL PARTICLES   Percentile in Reference Population    HDL-P (total)        High     75th    50th    25th   Low                         >34.9    34.9    30.5    26.7   <26.7    Small LDL-P          Low      25th    50th    75th   High                         <117     117     527     839    >839    LDL Size   <-Large (Pattern A)->    <-Small (Pattern B)->                      23.0    20.6           20.5      19.0   ----------------------------------------------------------  Small LDL-P and LDL Size are associated with CVD risk, but not after  LDL-P is taken into account. LP-IR SCORE 27 <=45 Final     Comment: INSULIN RESISTANCE MARKER      <--Insulin Sensitive    Insulin Resistant-->             Percentile in Reference Population  Insulin Resistance Score  LP-IR Score   Low   25th   50th   75th   High                <27   27     45     63     >63  LP-IR Score is inaccurate if patient is non-fasting. The LP-IR score is a laboratory developed index that has been  associated with insulin resistance and diabetes risk and should be  used as one component of a physician's clinical assessment.       Narrative    Test(s) 403087-TXK-U; 350929-CPF-D; 447171-Jaodynbsencin; 835480-  Cholesterol, Total; 499192-VDW-K (Total); 444912-LZQUJ LDL-P; 126826-  LDL Size; 228930-XY-PZ Score  was developed and its performance characteristics determined  by Constant[facility] Ferris. It has not been cleared or approved by the Food  and Drug Administration. Performed at:  [facility]  [address]  [id number]  : Mariama Siu MD, Phone:  [phone]        EKG:    Results for orders placed or performed during the hospital encounter of 01/12/22   EKG, 12 LEAD, INITIAL   Result Value Ref Range    Ventricular Rate 106 BPM    Atrial Rate 106 BPM    P-R Interval 148 ms    QRS Duration 108 ms    Q-T Interval 322 ms    QTC Calculation (Bezet) 427 ms    Calculated P Axis 68 degrees    Calculated R Axis 52 degrees    Calculated T Axis 37 degrees    Diagnosis       Sinus tachycardia  Incomplete right bundle branch block  Borderline ECG  No previous ECGs available  Confirmed by Hazel Trinh MD ([id number]) on 1/12/2022 4:22:17 PM       QTC interval  322 ms. No prolonged QTc noted. (Acceptable QTC upper limits:  440 ms for Males, 460 ms for Females)    ASSESSMENT     ICD-10-CM ICD-9-CM    1. Class 3 severe obesity due to excess calories with serious comorbidity and body mass index (BMI) of 45.0 to 49.9 in adult ([facility]  E66.01 278.01     Z68.42 V85.42    2. High cholesterol  E78.00 272.0 URIC ACID      METABOLIC PANEL, COMPREHENSIVE      NMR LIPOPROFILE WITH LIPIDS (WITHOUT GRAPH)    w elevated LDLP, sdLDLP   3. Insulin resistance  E88.81 277.7 metFORMIN ER (GLUCOPHAGE XR) 500 mg tablet   4. Hyperkalemia  E87.5 276.7 MAGNESIUM   5. MAHI on CPAP  G47.33 327.23     Z99.89 V46.8    6. Vitamin D deficiency  E55.9 268.9 VITAMIN D, 25 HYDROXY   7. Irregular menses  N92.6 626.4 CBC W/O DIFF    improving w weight loss and diet changes   8. Other constipation  K59.09 564.09    9. Incomplete right bundle branch block (RBBB) determined by electrocardiography  I45.10 426.4    10. Adjustment disorder with anxious mood  F43.22 309.24    11.  Vegan diet Z78.9 V49.89    12. Chronic GERD  K21.9 530.81     resolved with weight loss   13. Hair loss  L65.9 704.00     unchanged since starting VLCD   14. S/P laparoscopic cholecystectomy  Z90.49 V45.89    15. BMI 45.0-49.9, adult [facility])  Z68.42 V85.42        We discussed the expected course, resolution and complications of the diagnosis(es) in detail. WEIGHT MANAGEMENT PLAN    Chart was reviewed and updated during the office visit today. Emphasized the importance of the patient continuing care from current primary care provider and other specialists while participating in this weight management program.  Patient has full access to all our office notes, orders, lab results on [facility] and can provide them copies, if desired. Advised patient to follow up with pcp for any acute symptoms and Health Maintenance. Continue current medications as directed by prescribers. Medication(s) prescribed today as noted above and sent electronically to the pharmacy on file after discussing risks, benefits, costs, interactions, alternatives, contraindications and potential side effects:  Titrate Metformin 500 mg XR I po BID up to 2 po BID as discussed, if tolerated for better insulin management and appetite suppression. Try OTC Smooth Move Tea for constipation. Identified and discussed medications patient is taking that can cause weight gain or weight loss as recorded on patient's medication list.    Advised patient not to stop any use without discussing with the prescribing provider. Ask prescribing providers to consider more weight neutral or weight negative options. Will monitor patient's weight and health with continued use. Supplement recommendations: Take OTC Magnesium 400 mg po at night prn sleep, muscle cramping, constipation. Take OTC vit B12 1000 mcg daily orally if taking Metformin or experiencing numbness and tingling.     Take OTC Fish Oil 1000 mg with EPA and -1,000 mg daily if experiencing dry skin, low HDL. Use with caution if history of heartburn exists. Increase fiber products (I.e. fiber tea, fiber shakes) or try OTC Fiber products (I.e. Benefiber, Metamucil, psyllium, etc) if experiencing constipation. Take OTC Gas-X and/or Lactaid with meal replacements, if lactose intolerance history exists or symptoms begin. Referred patient to [facility] Patient Manual for recommended antidotes, if any new symptoms or side effects have been experienced once dietary approach is initiated. Advised patient to notify our office if this occurs. Reminded patients who are female gender and of reproductive age that with weight loss, they may become more fertile. Recommended the use of condoms or some reliable form of contraception if pregnancy is not desired. Notify our office immediately if patient becomes pregnant. Reviewed and discussed most recent lab results and EKG. Reviewed and discussed EKG results. If test results or EKG have already been performed by another provider and are not available today, advised patient to sign a release to provide our program a copy of each. Recheck pertinent labs monthly while participating in VLCD and using [facility] meal replacements, as discussed to identify electrolyte abnormalities and guide therapeutic approach. Will recheck EKG for every 50 pounds of weight loss, per [facility] protocol. An order form for pertinent labs was given to patient today. Patient was advised to have the labwork performed 1 week prior to the next monthly appointment with me. Labs ordered today will be reviewed in detail at the next office visit and time allowed for any questions regarding the results. Advised patient to sign up for [employer] and view lab results directly. Notify me by [employer] if any questions or concerns arise. Discussed the patient's BMI, anthropometric measures and goals with patient.   Informed patient that weight loss goal of 5-10% in 6-12 months has shown significant improvement in obesity and its related health conditions including DM, heart disease, asthma. A key study published in Arthritis & Rheumatism of Overweight and Obese Adults with OA found that losing 1 pound of weight resulted in 4 pounds of pressure being removed from the knees. Dietary Prescription:    Karlene Warren has fulfilled [facility] program requirements this month by completing homework forms, attending educational classes, nurse triage visits and monthly provider appointments as agreed and remains in good standing. Reviewed and appreciate registered dietician note for nutritional counseling. Patient has attended all requirements of the program over the past month and is in good standing. Reviewed and appreciate weekly nurse visits and agree with documentation. Followed up on any patient concerns today. Recommended meal plan:  [facility]irection Very Low Calorie Dietary approach   Make sure proper daily calories are consumed for each dietary approach: VLCD 800 kcal/day, 3-4 meal replacements daily. Encouraged patient to stay within the recommended amount of calories per day. Do not skip meals. Meals must be eaten within a 3-4 hour time period in order to prevent potential side effects, including low blood sugar. Schedule 1:1 visit with RD to explore more protein rich vegan sources. Consume a minimum of 2 L (67 oz) of water daily up to half your body weight in ounces of water while utilizing this dietary approach. Drink the majority of water prior to supper to prevent nocturnal urination. Avoid sugar-sweetened beverages, including sodas, alcohol, juice. Limit use of water enhancers. May try water infusion recipes instead. Limit caffeine intake to prevent sleep interference, heart palpitations and dehydration from increased urination.   Will allow 1 8 oz cup of black coffee or green tea (to stimulate release of Adiponectin and promote fat burning. ). Consume caffeine 6-12 hours before bedtime to prevent sleep disturbances. Exercise Prescription: Advised minimal, gradual increase and as tolerated especially while using VLCD. Emphasized importance of mild physical activity and reducing sedentary time. Advised patient to work with RD to ensure you have proper calories for your level of activity. If already exercising, reduce it to half the time and intensity while determining the level of tolerance for the amount of caloric intake. A goal of 30 minutes physical activity daily is recommended for health benefit and at least 60 minutes daily to prevent weight regain. During weight loss phase, no less than 75% of this time needs to be performing aerobic (i.e. Walking) activity with no more than 25% of the time performing resistance exercises (i.e. Weight lifting, strengthening, toning). During weight maintenance phase, 50% of the physical activity time needs to be spent performing aerobic activity with 50% of the total time performing resistance exercises. Behavior and Lifestyle Prescription:  Counseled patient on stressors, stress management and self-care approaches. Encouraged pt to seek formal counseling to further address stressors identified today. Sleep Prescription: A goal of 7-9 hours nightly of uninterrupted sleep is recommended to turn off the Grehlin hormone to be released from the stomach and triggers appetite while promoting weight gain. Proper rest turns on Leptin hormone to be released from white adipose tissue and promotes weight loss. Discussed snoring, symptoms of Sleep Apnea and improvements with weight loss. Strongly encouraged compliance with CPAP, if Sleep Apnea has been diagnosed. Advised patient to follow up with sleep specialist for every 25 pounds lost to see if CPAP settings need to be adjusted.        Counseled patient on health topics:  Obesity, Insulin Resistance, VLCD dietary approaches, benefits, contraindications, possible side effects to these diets and how to prevent poor outcomes (including staying hydrated, limit physical exercise while transitioning into this program, avoid skipping meals, etc), weight loss goals, sleep hygiene, barriers to losing weight and keeping weight off, strategies to overcome habits or challenges to approve or continue adherence and stressors. Immunizations noted. Covid-19 vaccines recommended, if patient has not had it. Obesity may increase risk for severe illness and death from Covid-19 disease. Offered empathy, encouragement, legitimation, prayers, partnership to patient. Praised patient for successes. Patient was offered a choice/choices in the treatment plan today. Patient expressed understanding with the diagnoses and the plan and agrees with recommendations. Return in about 1 month (around 4/16/2022). Total time:  46 mins spent in counseling, coordinating care, reviewing and discussing results, reviewing and discussing relevant provider communication, completing relevant documentation, and discussing treatment plans in reference to The primary encounter diagnosis was Class 3 severe obesity due to excess calories with serious comorbidity and body mass index (BMI) of 45.0 to 49.9 in adult [facility]). Diagnoses of High cholesterol, Insulin resistance, Hyperkalemia, MAHI on CPAP, Vitamin D deficiency, Irregular menses, Other constipation, Incomplete right bundle branch block (RBBB) determined by electrocardiography, Adjustment disorder with anxious mood, Vegan diet, Chronic GERD, Hair loss, S/P laparoscopic cholecystectomy, and BMI 45.0-49.9, adult ([facility] were also pertinent to this visit. Pam Boo, was evaluated through a synchronous (real-time) audio-video encounter. The patient (or guardian if applicable) is aware that this is a billable service, which includes applicable co-pays.  This Virtual Visit was conducted with patient's (and/or legal guardian's) consent. The visit was conducted pursuant to the emergency declaration under the [address] [place] [address] authority and the [employer] Act. Patient identification was verified, and a caregiver was present when appropriate. The patient was located in a state where the provider was licensed to provide care. THANK YOU [facility], Not On File, [facility] FOR ALLOWING ME THE PRIVILEGE TO PARTICIPATE IN Ms. José Miguel Valdez WITH RESPECT TO WEIGHT MANAGEMENT. Esther George DO, AOBRIAN, GEETA, SALTY    There are no Patient Instructions on file for this visit.

## 2022-03-16 NOTE — PROGRESS NOTES
1. Have you been to the ER, urgent care clinic since your last visit? Hospitalized since your last visit? No    2. Have you seen or consulted any other health care providers outside of the [address] [place] [address] since your last visit? Include any pap smears or colon screening.  No

## 2022-03-17 ENCOUNTER — OFFICE VISIT (OUTPATIENT)
Dept: SURGERY | Age: 52
End: 2022-03-17

## 2022-03-17 DIAGNOSIS — E66.01 CLASS 3 SEVERE OBESITY DUE TO EXCESS CALORIES WITH SERIOUS COMORBIDITY AND BODY MASS INDEX (BMI) OF 45.0 TO 49.9 IN ADULT (HCC): Primary | ICD-10-CM

## 2022-03-17 NOTE — PROGRESS NOTES
[facility]  Metabolic Weight Loss Program        Patient's Name: Alyse Murillo  : 1970    This patient is enrolled in [address] [place] [address] Weight Loss Program and attended the required weekly virtual nutrition class hosted via [employer].       Kb Urrutia, MS, RD, LDN

## 2022-03-18 PROBLEM — Z82.49 FAMILY HISTORY OF BLOOD CLOTS: Status: ACTIVE | Noted: 2022-01-11

## 2022-03-18 PROBLEM — Z78.9 VEGAN DIET: Status: ACTIVE | Noted: 2022-01-11

## 2022-03-18 PROBLEM — E66.01 MORBID OBESITY WITH BMI OF 50.0-59.9, ADULT (HCC): Status: ACTIVE | Noted: 2021-11-23

## 2022-03-18 PROBLEM — Z90.49 S/P LAPAROSCOPIC CHOLECYSTECTOMY: Status: ACTIVE | Noted: 2022-01-11

## 2022-03-19 PROBLEM — D05.81: Status: ACTIVE | Noted: 2022-01-11

## 2022-03-19 PROBLEM — K21.9 CHRONIC GERD: Status: ACTIVE | Noted: 2022-01-11

## 2022-03-19 PROBLEM — K59.09 OTHER CONSTIPATION: Status: ACTIVE | Noted: 2022-01-11

## 2022-03-19 PROBLEM — N92.6 IRREGULAR MENSES: Status: ACTIVE | Noted: 2022-01-11

## 2022-03-19 PROBLEM — E66.01 CLASS 3 SEVERE OBESITY DUE TO EXCESS CALORIES WITH SERIOUS COMORBIDITY AND BODY MASS INDEX (BMI) OF 50.0 TO 59.9 IN ADULT (HCC): Status: ACTIVE | Noted: 2022-01-11

## 2022-03-19 PROBLEM — E55.9 VITAMIN D DEFICIENCY: Status: ACTIVE | Noted: 2022-01-11

## 2022-03-19 PROBLEM — R00.0 INCREASED HEART RATE: Status: ACTIVE | Noted: 2022-01-11

## 2022-03-19 PROBLEM — L65.9 HAIR LOSS: Status: ACTIVE | Noted: 2022-01-11

## 2022-03-19 PROBLEM — R63.5 ABNORMAL WEIGHT GAIN: Status: ACTIVE | Noted: 2022-01-11

## 2022-03-19 PROBLEM — Z83.49 FAMILY HISTORY OF THYROID DISEASE: Status: ACTIVE | Noted: 2022-01-11

## 2022-03-20 PROBLEM — Z99.89 OSA ON CPAP: Status: ACTIVE | Noted: 2022-01-11

## 2022-03-20 PROBLEM — F43.22 ADJUSTMENT DISORDER WITH ANXIOUS MOOD: Status: ACTIVE | Noted: 2022-01-11

## 2022-03-20 PROBLEM — Z82.49 FAMILY HISTORY OF HEART DISEASE: Status: ACTIVE | Noted: 2022-01-11

## 2022-03-20 PROBLEM — Z82.49 FAMILY HISTORY OF ANEURYSM: Status: ACTIVE | Noted: 2022-01-11

## 2022-03-20 PROBLEM — G47.33 OSA ON CPAP: Status: ACTIVE | Noted: 2022-01-11

## 2022-03-20 PROBLEM — Z83.3 FAMILY HISTORY OF DIABETES MELLITUS (DM): Status: ACTIVE | Noted: 2022-01-11

## 2022-03-23 ENCOUNTER — OFFICE VISIT (OUTPATIENT)
Dept: SURGERY | Age: 52
End: 2022-03-23

## 2022-03-23 DIAGNOSIS — E66.01 MORBID OBESITY WITH BMI OF 50.0-59.9, ADULT (HCC): Primary | ICD-10-CM

## 2022-03-24 NOTE — PROGRESS NOTES
[facility]  Metabolic Weight Loss Program        Patient's Name: Aleksey Navarro  : 1970    This patient is enrolled in [address] [place] [address] Weight Loss Program and attended the required weekly virtual nutrition class hosted via [address] [place] [address] today.       Liliana Ely RD

## 2022-03-25 ENCOUNTER — CLINICAL SUPPORT (OUTPATIENT)
Dept: SURGERY | Age: 52
End: 2022-03-25

## 2022-03-25 VITALS
TEMPERATURE: 98.3 F | HEART RATE: 83 BPM | BODY MASS INDEX: 41.95 KG/M2 | RESPIRATION RATE: 18 BRPM | SYSTOLIC BLOOD PRESSURE: 136 MMHG | DIASTOLIC BLOOD PRESSURE: 80 MMHG | HEIGHT: 70 IN | WEIGHT: 293 LBS | OXYGEN SATURATION: 98 %

## 2022-03-25 DIAGNOSIS — E66.01 OBESITY, CLASS III, BMI 40-49.9 (MORBID OBESITY) (HCC): Primary | ICD-10-CM

## 2022-03-25 NOTE — PROGRESS NOTES
Progress Note: Weekly Education Class in the [facility] Weight Loss Program         Patient is on Very Low Calorie Diet [x] (4 meal replacements per day, 800 kcal/day)      Low Calorie Diet [] (2-3 meal replacements per day, 8076-2631 kcal/day)    1) Did patient have any new symptoms or physical problems? Yes []    No [x]    If yes, check & comment: weakness [], fatigue [], lightheadedness [], headache [], cramps [], cold intolerance [], hair loss [], diarrhea [], constipation [],  NA [] other:                                 2) Has patient had any medical attention from other providers, urgent care or the emergency room this week? Yes []  No [x]       NA [], If yes, why:                                      3) Any other sugar sweetened beverages consumed this week? Yes []  No [x]    4) Did patient have any problems adhering to the diet? Yes []  No [x] NA []    If yes, Vacation [], Celebrations [], Conferences [], Family Reunions [] other:                                                5) How many hours of sleep this week? 43.75    (range)  NA []    Number of meal replacements consumed daily? 4 (range)  NA []    Average ounces of water patient consumed daily this week (not including shakes)? 71     (divide the weekly total by 7)    Did you eat any food outside of the program? Yes [x] No []    Physical Activity Over the Past Week:    Cardio exercise: 46 min  Strength exercise:0 workouts / week  Number of steps walked per day: 0    How has patient mood overall been this week? Sad [], Happy [], Stressed [], Tired [], Content [], NA [], other didn't answer           Medications reconciled by nurse Yes [x]  No[]    Patient was given therapeutic recommendations for any noted side effects of their dietary approach based upon [facility] patient manual per providers recommendation.

## 2022-03-25 NOTE — PROGRESS NOTES
1. Have you been to the ER, urgent care clinic since your last visit? Hospitalized since your last visit? No    2. Have you seen or consulted any other health care providers outside of the [address] [place] [address] since your last visit? Include any pap smears or colon screening.  No

## 2022-03-31 ENCOUNTER — OFFICE VISIT (OUTPATIENT)
Dept: SURGERY | Age: 52
End: 2022-03-31

## 2022-04-01 ENCOUNTER — CLINICAL SUPPORT (OUTPATIENT)
Dept: SURGERY | Age: 52
End: 2022-04-01

## 2022-04-01 VITALS
WEIGHT: 293 LBS | HEART RATE: 89 BPM | SYSTOLIC BLOOD PRESSURE: 116 MMHG | DIASTOLIC BLOOD PRESSURE: 78 MMHG | TEMPERATURE: 98.2 F | OXYGEN SATURATION: 96 % | BODY MASS INDEX: 41.95 KG/M2 | HEIGHT: 70 IN | RESPIRATION RATE: 20 BRPM

## 2022-04-01 DIAGNOSIS — E66.01 OBESITY, CLASS III, BMI 40-49.9 (MORBID OBESITY) (HCC): Primary | ICD-10-CM

## 2022-04-01 NOTE — PROGRESS NOTES
1. Have you been to the ER, urgent care clinic since your last visit? Hospitalized since your last visit? No    2. Have you seen or consulted any other health care providers outside of the [address] [place] [address] since your last visit? Include any pap smears or colon screening. No         Progress Note: Weekly Education Class in the [facility] Weight Loss Program         Patient is on Very Low Calorie Diet [x] (4 meal replacements per day, 800 kcal/day)      Low Calorie Diet [] (2-3 meal replacements per day, 7625-9695 kcal/day)    1) Did patient have any new symptoms or physical problems? Yes []    No [x]    If yes, check & comment: weakness [], fatigue [], lightheadedness [], headache [], cramps [], cold intolerance [], hair loss [], diarrhea [], constipation [],  NA [] other:                                 2) Has patient had any medical attention from other providers, urgent care or the emergency room this week? Yes []  No [x]       NA [], If yes, why:                                       3) Any other sugar sweetened beverages consumed this week? Yes []  No [x]    4) Did patient have any problems adhering to the diet? Yes []  No [x] NA []    If yes, Vacation [], Celebrations [], Conferences [], Family Reunions [] other:                                                5) How many hours of sleep this week? 5.5-8    (range)  NA []    Number of meal replacements consumed daily? 4 (range)  NA []    Average ounces of water patient consumed daily this week (not including shakes)? 70   (divide the weekly total by 7)    Did you eat any food outside of the program? Yes [] No [x]    Physical Activity Over the Past Week:    Cardio exercise: n/a min  Strength exercise:n/a workouts / week  Number of steps walked per day: 2144-8816    How has patient mood overall been this week?  Sad [], Happy [], Stressed [], Tired [], Content [], NA [], other  okay           Medications reconciled by nurse Yes [x]  No[]    Patient was given therapeutic recommendations for any noted side effects of their dietary approach based upon [employer] patient manual per providers recommendation.

## 2022-04-01 NOTE — PROGRESS NOTES
[facility]  Metabolic Weight Loss Program        Patient's Name: Ke Lala  : 1970    This patient is enrolled in [address] [place] [address] Weight Loss Program and attended the required weekly virtual nutrition class hosted via [employer] today.       Nicolas Branham, MS, RD, LDN

## 2022-04-01 NOTE — PROGRESS NOTES
Bety Jones presents for weekly or bi-monthly evaluation of Obesity Body mass index is 46.12 kg/m². Visit Vitals  /85 (BP 1 Location: Left arm, BP Patient Position: Sitting, BP Cuff Size: Adult)   Pulse 96   Temp 98.2 °F (36.8 °C) (Oral)   Ht 5' 10\" (1.778 m)   Wt 321 lb 6.4 oz (145.8 kg)   SpO2 98%   BMI 46.12 kg/m²       Wt Readings from Last 3 Encounters:   03/25/22 318 lb 12.8 oz (144.6 kg)   03/11/22 321 lb 3.2 oz (145.7 kg)   03/04/22 321 lb 6.4 oz (145.8 kg)       1. Obesity, Class III, BMI 40-49.9 (morbid obesity) ([address]         I have reviewed and agree with nurse documentation of Weekly Education Class nurse progress note for [address] Weight [address] [place] [address] [place]). Ben Hess is compliant with program requirements and may continue participation utilizing the [employer] meal replacements, as discussed. Patient has been advised to refer to the [facility] Patient Manual and notify us if any side effects to this meal plan are present and/or persist.  Ben Hess may continue the current dietary approach, care and follow up at the monthly office visit with the provider, as scheduled. Follow-up and Dispositions    · Return in about 1 week (around 3/11/2022). Documentation true and accepted by Michael Hong.  Gregory Choi, AOBFP, Diplomate SALTY GILLETTE

## 2022-04-02 LAB
25(OH)D3+25(OH)D2 SERPL-MCNC: 37.2 NG/ML (ref 30–100)
ALBUMIN SERPL-MCNC: 4 G/DL (ref 3.8–4.9)
ALBUMIN/GLOB SERPL: 1.5 {RATIO} (ref 1.2–2.2)
ALP SERPL-CCNC: 61 IU/L (ref 44–121)
ALT SERPL-CCNC: 19 IU/L (ref 0–32)
AST SERPL-CCNC: 16 IU/L (ref 0–40)
BILIRUB SERPL-MCNC: 0.3 MG/DL (ref 0–1.2)
BUN SERPL-MCNC: 11 MG/DL (ref 6–24)
BUN/CREAT SERPL: 21 (ref 9–23)
CALCIUM SERPL-MCNC: 9 MG/DL (ref 8.7–10.2)
CHLORIDE SERPL-SCNC: 101 MMOL/L (ref 96–106)
CHOLEST SERPL-MCNC: 175 MG/DL (ref 100–199)
CO2 SERPL-SCNC: 21 MMOL/L (ref 20–29)
CREAT SERPL-MCNC: 0.53 MG/DL (ref 0.57–1)
EGFR: 111 ML/MIN/1.73
ERYTHROCYTE [DISTWIDTH] IN BLOOD BY AUTOMATED COUNT: 13.9 % (ref 11.7–15.4)
GLOBULIN SER CALC-MCNC: 2.6 G/DL (ref 1.5–4.5)
GLUCOSE SERPL-MCNC: 90 MG/DL (ref 65–99)
HCT VFR BLD AUTO: 39.5 % (ref 34–46.6)
HDL SERPL-SCNC: 32.7 UMOL/L
HDLC SERPL-MCNC: 55 MG/DL
HGB BLD-MCNC: 12.5 G/DL (ref 11.1–15.9)
LDL SERPL QN: 21.3 NM
LDL SERPL-SCNC: 1218 NMOL/L
LDL SMALL SERPL-SCNC: 377 NMOL/L
LDLC SERPL CALC-MCNC: 105 MG/DL (ref 0–99)
LP-IR SCORE SERPL: 33
MAGNESIUM SERPL-MCNC: 1.9 MG/DL (ref 1.6–2.3)
MCH RBC QN AUTO: 30 PG (ref 26.6–33)
MCHC RBC AUTO-ENTMCNC: 31.6 G/DL (ref 31.5–35.7)
MCV RBC AUTO: 95 FL (ref 79–97)
PLATELET # BLD AUTO: 256 X10E3/UL (ref 150–450)
POTASSIUM SERPL-SCNC: 5.2 MMOL/L (ref 3.5–5.2)
PROT SERPL-MCNC: 6.6 G/DL (ref 6–8.5)
RBC # BLD AUTO: 4.17 X10E6/UL (ref 3.77–5.28)
SODIUM SERPL-SCNC: 138 MMOL/L (ref 134–144)
TRIGL SERPL-MCNC: 82 MG/DL (ref 0–149)
URATE SERPL-MCNC: 4.9 MG/DL (ref 3–7.2)
WBC # BLD AUTO: 6.4 X10E3/UL (ref 3.4–10.8)

## 2022-04-04 NOTE — PROGRESS NOTES
Vincent Valdez presents for weekly or bi-monthly evaluation of Obesity Body mass index is 45.74 kg/m². Visit Vitals  /80 (BP 1 Location: Left arm, BP Patient Position: Sitting, BP Cuff Size: Adult)   Pulse 83   Temp 98.3 °F (36.8 °C) (Oral)   Resp 18   Ht 5' 10\" (1.778 m)   Wt 318 lb 12.8 oz (144.6 kg)   SpO2 98%   BMI 45.74 kg/m²       Wt Readings from Last 3 Encounters:   04/01/22 313 lb 3.2 oz (142.1 kg)   03/25/22 318 lb 12.8 oz (144.6 kg)   03/11/22 321 lb 3.2 oz (145.7 kg)       1. Obesity, Class III, BMI 40-49.9 (morbid obesity) ([address]         I have reviewed and agree with nurse documentation of Weekly Education Class nurse progress note for [facility] Weight [facility] [place]). Domenick Boas is compliant with program requirements and may continue participation utilizing the [employer] meal replacements, as discussed. Patient has been advised to refer to the [facility] Patient Manual and notify us if any side effects to this meal plan are present and/or persist.  Domenick Boas may continue the current dietary approach, care and follow up at the monthly office visit with the provider, as scheduled. Follow-up and Dispositions    · Return in about 1 week (around 4/1/2022). Documentation true and accepted by Rula Sharma.  Maira Vieyra., AOREIDP, Diplomate SALTY GILLETTE

## 2022-04-04 NOTE — PROGRESS NOTES
Cassandra Valdez presents for weekly or bi-monthly evaluation of Obesity Body mass index is 46.09 kg/m². Visit Vitals  /82   Pulse 93   Temp 97.8 °F (36.6 °C) (Oral)   Resp 18   Ht 5' 10\" (1.778 m)   Wt 321 lb 3.2 oz (145.7 kg)   SpO2 98%   BMI 46.09 kg/m²       Wt Readings from Last 3 Encounters:   04/01/22 313 lb 3.2 oz (142.1 kg)   03/25/22 318 lb 12.8 oz (144.6 kg)   03/11/22 321 lb 3.2 oz (145.7 kg)       1. Obesity, Class III, BMI 40-49.9 (morbid obesity) ([address]         I have reviewed and agree with nurse documentation of Weekly Education Class nurse progress note for [employer] Weight [address] [place] [address] [place]). Sarah Castanon is compliant with program requirements and may continue participation utilizing the [employer] meal replacements, as discussed. Patient has been advised to refer to the [facility] Patient Manual and notify us if any side effects to this meal plan are present and/or persist.  Sarah Castanon may continue the current dietary approach, care and follow up at the monthly office visit with the provider, as scheduled. Follow-up and Dispositions    · Return in about 1 week (around 3/18/2022). Documentation true and accepted by Marjan Joaquin.  Franca Craft, AOREIDP, Diplomate SALTY GILLETTE

## 2022-04-06 ENCOUNTER — OFFICE VISIT (OUTPATIENT)
Dept: SURGERY | Age: 52
End: 2022-04-06

## 2022-04-07 ENCOUNTER — OFFICE VISIT (OUTPATIENT)
Dept: SURGERY | Age: 52
End: 2022-04-07
Payer: COMMERCIAL

## 2022-04-07 VITALS
BODY MASS INDEX: 41.95 KG/M2 | SYSTOLIC BLOOD PRESSURE: 122 MMHG | HEIGHT: 70 IN | WEIGHT: 293 LBS | DIASTOLIC BLOOD PRESSURE: 64 MMHG | TEMPERATURE: 97.9 F | HEART RATE: 81 BPM | OXYGEN SATURATION: 99 % | RESPIRATION RATE: 20 BRPM

## 2022-04-07 DIAGNOSIS — F43.22 ADJUSTMENT DISORDER WITH ANXIOUS MOOD: ICD-10-CM

## 2022-04-07 DIAGNOSIS — E66.01 OBESITY, CLASS III, BMI 40-49.9 (MORBID OBESITY) (HCC): Primary | ICD-10-CM

## 2022-04-07 DIAGNOSIS — E78.00 HIGH CHOLESTEROL: ICD-10-CM

## 2022-04-07 DIAGNOSIS — Z78.9 VEGAN DIET: ICD-10-CM

## 2022-04-07 DIAGNOSIS — E87.5 HYPERKALEMIA: ICD-10-CM

## 2022-04-07 DIAGNOSIS — G47.33 OSA ON CPAP: ICD-10-CM

## 2022-04-07 DIAGNOSIS — Z99.89 OSA ON CPAP: ICD-10-CM

## 2022-04-07 DIAGNOSIS — E88.81 INSULIN RESISTANCE: ICD-10-CM

## 2022-04-07 DIAGNOSIS — Z90.49 S/P LAPAROSCOPIC CHOLECYSTECTOMY: ICD-10-CM

## 2022-04-07 DIAGNOSIS — K21.9 CHRONIC GERD: ICD-10-CM

## 2022-04-07 DIAGNOSIS — R63.4 WEIGHT LOSS: ICD-10-CM

## 2022-04-07 DIAGNOSIS — K59.09 OTHER CONSTIPATION: ICD-10-CM

## 2022-04-07 DIAGNOSIS — E55.9 VITAMIN D DEFICIENCY: ICD-10-CM

## 2022-04-07 PROCEDURE — 99214 OFFICE O/P EST MOD 30 MIN: CPT | Performed by: FAMILY MEDICINE

## 2022-04-07 RX ORDER — METFORMIN HYDROCHLORIDE 1000 MG/1
1 TABLET, FILM COATED, EXTENDED RELEASE ORAL 2 TIMES DAILY
Qty: 180 TABLET | Refills: 1 | Status: SHIPPED | OUTPATIENT
Start: 2022-04-07

## 2022-04-07 NOTE — PROGRESS NOTES
1. Have you been to the ER, urgent care clinic since your last visit? Hospitalized since your last visit? No    2. Have you seen or consulted any other health care providers outside of the [address] [place] [address] since your last visit? Include any pap smears or colon screening. No    Progress Note: Weekly Education Class in the [facility] Weight Loss Program         Patient is on Very Low Calorie Diet [x] (4 meal replacements per day, 800 kcal/day)      Low Calorie Diet [] (2-3 meal replacements per day, 1762-9640 kcal/day)    1) Did patient have any new symptoms or physical problems? Yes []    No [x]    If yes, check & comment: weakness [], fatigue [], lightheadedness [], headache [], cramps [], cold intolerance [], hair loss [], diarrhea [], constipation [],  NA [] other:                                 2) Has patient had any medical attention from other providers, urgent care or the emergency room this week? Yes []  No [x]       NA [], If yes, why:                                      3) Any other sugar sweetened beverages consumed this week? Yes []  No [x]    4) Did patient have any problems adhering to the diet? Yes []  No [x] NA []    If yes, Vacation [], Celebrations [], Conferences [], Family Reunions [] other:                                                5) How many hours of sleep this week? 4.5-6.5   (range)  NA []    Number of meal replacements consumed daily? 4 (range)  NA []    Average ounces of water patient consumed daily this week (not including shakes)? 80  (divide the weekly total by 7)    Did you eat any food outside of the program? Yes [] No [x]    Physical Activity Over the Past Week:    Cardio exercise:  min  Strength exercise:  workouts / week  Number of steps walked per day: 9445-06480    How has patient mood overall been this week?  Sad [], Happy [], Stressed [], Tired [], Content [], NA [], other   okay          Medications reconciled by nurse Yes [x]  No[]    Patient was given therapeutic recommendations for any noted side effects of their dietary approach based upon [employer] patient manual per providers recommendation.

## 2022-04-07 NOTE — PROGRESS NOTES
[address] [place] [address] 22.  [phone] o  [phone] f    FOLLOW UP MEDICAL EXAMINATION    Method of Delivery:   Face to Face  Patient:  Sarah Castanon, 1970  PCP:  Nydia Wilson, Not On File, PA-C    Patient's weight management approach:  Very Low Calorie Diet (VLCD, 800 kcal/day), Number meal replacements consumed per day:  4  Current Weight Loss Medication:  Metformin 500 mg 2 po BID    Sarah Castanon is a 45 y.o. female with Obesity Class 3 Body mass index is 44.35 kg/m². and associated health concerns.   Patient presents today for Weight Management, Discuss Medications, and Labs (discuss)      Challenges adhering to the plan over the past month:  0    Patient goals for participation in weight management program:   Ultimate goal:  Lose at least 140 lbs  (weight of 219 lbs) - I prefer a moving target.  I want to learn.  My short term goal is to first get out of the 300 lbs and into the 200 lbs.  Second goal:  250 lbs      Anthropometric Measures Previously    Start Date:  01/11/22  Start LAMYNN:  243 NBH 6.3 WS                BMI:  51.55 kg/m2      Neck circumference:  17.5 in             Anthropometric MeasuresToday  Today's Weight 4/7/2022: 309 lbs 1.6 oz      Weight Metrics 4/7/2022 4/7/2022 4/1/2022 3/25/2022 3/11/2022 3/4/2022 2/25/2022   Weight - 309 lb 1.6 oz 313 lb 3.2 oz 318 lb 12.8 oz 321 lb 3.2 oz 321 lb 6.4 oz 326 lb 14.4 oz   Neck Circ (inches) 16.75 - - - - - -   Waist Measure Inches 54 - - - - - -   Body Fat % 47.5 - - - - - -   BMI - 44.35 kg/m2 44.94 kg/m2 45.74 kg/m2 46.09 kg/m2 46.12 kg/m2 46.91 kg/m2     Neck Circumference:  Acceptable range for M >16 inches, F>15 inches  Waist Circumference:  Acceptable range for M> 40 inches/102 cm, F > 35 inches, 88 cm  Body Fat: Acceptable range for M 18-24%, F 25-31%    Weight at last appointment on 03/16/22:  318 lbs 0 oz      Pounds loss since the last doctor appointment with our Center a month ago:  9 lbs  Total pounds loss since starting this therapeutic approach on start date: 50 lbs. Is patient satisfied with his/her progress since the last appointment: Yes  Factors contributing to weight change:  VLCD. Having the right mindset. Starting Metformin. Doing the work. SURGICAL HISTORY  Previous Weight Loss Surgery:  0     Past Surgical History:   Procedure Laterality Date    HX APPENDECTOMY  1985    HX BREAST BIOPSY Right 2017    due to LCIS. Dr. Michelle Núñez HX CHOLECYSTECTOMY  2009    HX COLONOSCOPY  2017    w polypectomy. due q 5 yrs. Dr. Katie Hoskins.  HX ENDOSCOPY  2017    due to severe GERD. Dr. Lizbeth Cruz  Weight loss medication prescribed by our office:  Metformin 500 mg BID, started 02/2022 and increased to 2 BID over the past month, tolerating well    Negative side effects of weight loss medication: No  Are hunger, cravings and appetite controlled with use of the weight loss medication:   Yes  Is the medication tolerated well:  Yes  Does patient desire refills of any medications previously prescribed by our office:  Yes    Home Medications    Medication Sig Start Date End Date Taking? Authorizing Provider   metFORMIN ER (GLUCOPHAGE XR) 500 mg tablet Take 2 Tablets by mouth two (2) times a day. Indications: prevention of type 2 diabetes mellitus 3/16/22  Yes B[place]mer Closs, Joynita R, DO   ergocalciferol (ERGOCALCIFEROL) 1,250 mcg (50,000 unit) capsule Take 1 Capsule by mouth every seven (7) days. Indications: low vitamin D levels 2/11/22  Yes [place]mer Closs, Joynita R, DO   b complex vitamins tablet Take 1 Tablet by mouth daily. Yes Provider, Historical   prenatal 00/OWAG fum/folic/dha (PRENATAL-1 PO) Take  by mouth. Yes Provider, Historical      Medications that cause weight gain:  0  Medications that cause weight loss:  Metformin  Any changes to medications since last office visit with me:   Yes and Increased Metformin 500 mg from I BIID to 2 po BID  Allergies   Allergen Reactions    Saxenda [Liraglutide (Weight Loss)] Other (comments)     3.0 MG  SEVERE ABDOMINAL PAIN - UNABLE TO STAND  CONSTIPATION       EATING HABITS  Patient met with the RD over the past month:  No  Nutritional changes made over the last month per recommendation of the RD:  0   Are hunger, cravings and appetite controlled:  Yes  Negative Side Effects from meal replacements:  No   Does patient want to utilize New Direction meal replacements:  Yes  Barriers to eating meals:  0    DRINKING HABITS  Average amount of water consumed daily: 64-90 oz/day  (Goal 2 L or 67 oz daily, minimally)  Amount of caffeine consumed daily: 10 oz coffee w sugar free coffee mate last night while staying up to pack   Sugar-sweetened beverages consumed daily (including alcohol, sodas, teas, juices, etc.): 0  Barriers preventing patient from drinking minimum 2L water/day:  0     Social History     Tobacco Use    Smoking status: Former Smoker     Packs/day: 1.00     Years: 10.00     Pack years: 10.00     Types: Cigarettes     Quit date: 2007     Years since quitting: 15.2    Smokeless tobacco: Never Used   Vaping Use    Vaping Use: Never used   Substance Use Topics    Alcohol use: Yes     Alcohol/week: 3.0 standard drinks     Types: 3 Glasses of wine per week     Comment: weekends    Drug use: Not Currently         SLEEP HABITS  Total hours of sleep nightly: 4.5-5 hours (Goal 7-9 hours/nightly)  Rx or OTC Sleep Aids:  0   Sleep Hx:  Sleep Hx:  MAHI w CPAP by Dr. 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 Waking myself up mid night thinking about what I need to do.      PHYSICAL ACTIVITY HISTORY  Type of physical activity:  Walking and packing the house  Enjoyment with physical activity:  Not so much  Average number steps per day:  Over 10,000 steps/day  Barriers limiting physical activity:  0    BEHAVIORAL HEALTH HISTORY  DEPRESSION SCREENING:    3 most recent PHQ Screens 2022   Little interest or pleasure in doing things Not at all   Feeling down, depressed, irritable, or hopeless Not at all   Total Score PHQ 2 0       Any history of mental health conditions (including Depression, Anxiety, Anorexia, Bulimia or Binge Eating disorder): 0  Treating provider and medication(s):  0  Any current major lifestyle changes or stressors:   Getting my home ready to sale and moving to a new home in [place]   Is mental health condition controlled: Yes. No SI/SA. Mobile Apps used for meal planning, calorie counting, water consumption, sleep, or physical activity:  0     ASSOCIATED MEDICAL CONDITIONS  Past Medical History:   Diagnosis Date    Colon polyps     Constipation     Gallstone     GERD (gastroesophageal reflux disease)     Dr. 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 stage 0. [address]   Dr. Katharine Canales, breast surgeon    Morbid obesity ([facility] [address]     MAHI on CPAP     Dr. Holliday Asp Stool color black     Vitamin D deficiency        OB/GYN HISTORY (For Female Patients)  Social History     Substance and Sexual Activity   Sexual Activity Yes    Partners: Male    Birth control/protection: Condom     OB History        3    Para   1    Term   1            AB   2    Living   1       SAB   2    IAB        Ectopic        Molar        Multiple        Live Births   1               Menopause:  0  LMP:  2022 and every other month  Are you pregnant or planning on becoming pregnant within 6 months:  No    Do you have upcoming travel in the next 6 weeks:  Yes [place]  Patient will also notify the dietician for recommendations during travel. Immunization History   Administered Date(s) Administered    COVID-19, Pfizer Purple top, DILUTE for use, 12+ yrs, 30mcg/0.3mL dose 03/04/2021, 03/25/2021, 12/13/2021    Influenza Vaccine 12/13/2021       OTHER MEDICAL CARE SINCE LAST OFFICE VISIT  None. ROS  Pt denies hunger, cravings, lack of focus, fatigue, feeling weak, headaches, dizziness, light headedness, nausea, vomiting, diarrhea, constipation, indigestion, rapid heart rate, SOB, low blood sugar, feeling cold, hair loss, rash, fluid retention, leg aches, difficulty sleeping, irritability, mood swings, or other associated symptoms. Review of Systems negative except as noted above in HPI. HEALTH MAINTENANCE  Health Maintenance   Topic Date Due    Hepatitis C Screening  Never done    DTaP/Tdap/Td series (1 - Tdap) Never done    Cervical cancer screen  Never done    Breast Cancer Screen Mammogram  Never done    Colorectal Cancer Screening Combo  Never done    Shingrix Vaccine Age 50> (1 of 2) Never done    Depression Screen  04/01/2023    Lipid Screen  04/01/2027    Flu Vaccine  Completed    COVID-19 Vaccine  Completed    Pneumococcal 0-64 years  Aged Out       PHYSICAL EXAMINATION    Visit Vitals  /64 (BP 1 Location: Left arm, BP Patient Position: Sitting, BP Cuff Size: Thigh)   Pulse 81   Temp 97.9 °F (36.6 °C)   Resp 20   Ht 5' 10\" (1.778 m)   Wt 309 lb 1.6 oz (140.2 kg)   SpO2 99%   BMI 44.35 kg/m²           Weight Metrics 4/7/2022 4/7/2022 4/1/2022 3/25/2022 3/11/2022 3/4/2022 2/25/2022   Weight - 309 lb 1.6 oz 313 lb 3.2 oz 318 lb 12.8 oz 321 lb 3.2 oz 321 lb 6.4 oz 326 lb 14.4 oz   Neck Circ (inches) 16.75 - - - - - -   Waist Measure Inches 54 - - - - - -   Body Fat % 47.5 - - - - - -   BMI - 44.35 kg/m2 44.94 kg/m2 45.74 kg/m2 46.09 kg/m2 46.12 kg/m2 46.91 kg/m2          General appearance - Well nourished. Well appearing. Well developed. No acute distress. Obese. Head - Normocephalic. Atraumatic. Eyes - Extraocular eye movements intact. Sclera anicteric. Ears - Hearing is grossly normal bilaterally. Nose - normal and patent. Neck - supple. Midline trachea. No carotid bruits noted bilaterally. No thyromegaly noted. Chest - clear to auscultation bilaterally anteriorly and posteriorly. No wheezes. No rales or rhonchi. Breath sounds are symmetrical bilaterally. Unlabored respirations. Heart - normal rate. Regular rhythm. Normal S1, S2. No murmur noted. No rubs, clicks or gallops noted. Abdomen - soft and distended. No masses or organomegaly. No rebound, rigidity or guarding. Bowel sounds normal x 4 quadrants. No tenderness noted. Neurological - awake, alert and oriented to person, place, and time and event. Cranial nerves II through XII intact. Clear speech. Muscle strength is +5/5 x 4 extremities. Heme/Lymph - peripheral pulses normal x 4 extremities. No peripheral edema is noted. Musculoskeletal - Intact x 4 extremities. No pain with movement. Back exam - normal range of motion. No CVA tenderness. Negative Straight Leg Test bilaterally. No buffalo hump noted. Skin - no rashes, erythema, ecchymosis, lacerations, abrasions, suspicious moles noted. No skin tags or moles. No acanthosis nigricans noted in the axilla or neck. Psychological -   normal behavior, dress and thought processes. Good insight. Good eye contact. Normal affect. Appropriate mood. Normal speech.     DATA REVIEWED    Lab Results   Component Value Date/Time    WBC 6.4 04/01/2022 08:32 AM    HGB 12.5 04/01/2022 08:32 AM    HCT 39.5 04/01/2022 08:32 AM    PLATELET 174 93/37/9813 08:32 AM    MCV 95 04/01/2022 08:32 AM     Lab Results   Component Value Date/Time    Sodium 138 04/01/2022 08:32 AM    Potassium 5.2 04/01/2022 08:32 AM    Chloride 101 04/01/2022 08:32 AM    CO2 21 04/01/2022 08:32 AM    Glucose 90 04/01/2022 08:32 AM    BUN 11 04/01/2022 08:32 AM    Creatinine 0.53 (L) 04/01/2022 08:32 AM    BUN/Creatinine ratio 21 04/01/2022 08:32 AM    GFR est  01/12/2022 09:30 AM    GFR est non-AA 97 01/12/2022 09:30 AM    Calcium 9.0 04/01/2022 08:32 AM    Bilirubin, total 0.3 04/01/2022 08:32 AM    Alk.  phosphatase 61 04/01/2022 08:32 AM    Protein, total 6.6 04/01/2022 08:32 AM    Albumin 4.0 04/01/2022 08:32 AM    A-G Ratio 1.5 04/01/2022 08:32 AM    ALT (SGPT) 19 04/01/2022 08:32 AM    AST (SGOT) 16 04/01/2022 08:32 AM     Lab Results   Component Value Date/Time    Hemoglobin A1c 5.5 01/12/2022 09:30 AM     Lab Results   Component Value Date/Time    TSH 1.290 01/12/2022 09:30 AM     Lab Results   Component Value Date/Time    Uric acid 4.9 04/01/2022 08:32 AM     Magnesium   Date Value Ref Range Status   04/01/2022 1.9 1.6 - 2.3 mg/dL Final   01/12/2022 2.0 1.6 - 2.3 mg/dL Final     Lab Results   Component Value Date/Time    Vitamin B12 648 01/12/2022 09:30 AM    Folate 16.7 01/12/2022 09:30 AM     Lab Results   Component Value Date/Time    VITAMIN D, 25-HYDROXY 37.2 04/01/2022 08:32 AM     Lab Results   Component Value Date/Time    Iron 111 01/12/2022 09:30 AM     Lab Results   Component Value Date/Time    Cholesterol, Total 175 04/01/2022 08:32 AM   )  Results for orders placed or performed in visit on 03/16/22   [facility] (WITHOUT GRAPH)     Status: Abnormal   Result Value Ref Range Status    LDL-P 1,218 (H) <1,000 nmol/L Final     Comment:                           Low                   < 1000                            Moderate         1000 - 1299                            Borderline-High  1300 - 1599                            High             1600 - 2000                            Very High             > 2000      LDL-C(NIH CALC) 105 (H) 0 - 99 mg/dL Final     Comment:                           Optimal               <  100                            Above optimal     100 -  129                            Borderline        130 -  159 High              160 -  189                            Very high             >  189      HDL-C 55 >39 mg/dL Final    Triglycerides 82 0 - 149 mg/dL Final    Cholesterol, Total 175 100 - 199 mg/dL Final    HDL-P (Total) 32.7 >=30.5 umol/L Final    Small LDL-P 377 <=527 nmol/L Final    LDL size 21.3 >20.5 nm Final     Comment:  ----------------------------------------------------------                   ** INTERPRETATIVE INFORMATION**                   PARTICLE CONCENTRATION AND SIZE                      <--Lower CVD Risk   Higher CVD Risk-->    LDL AND HDL PARTICLES   Percentile in Reference Population    HDL-P (total)        High     75th    50th    25th   Low                         >34.9    34.9    30.5    26.7   <26.7    Small LDL-P          Low      25th    50th    75th   High                         <117     117     527     839    >839    LDL Size   <-Large (Pattern A)->    <-Small (Pattern B)->                      23.0    20.6           20.5      19.0   ----------------------------------------------------------  Small LDL-P and LDL Size are associated with CVD risk, but not after  LDL-P is taken into account. LP-IR SCORE 33 <=45 Final     Comment: INSULIN RESISTANCE MARKER      <--Insulin Sensitive    Insulin Resistant-->             Percentile in Reference Population  Insulin Resistance Score  LP-IR Score   Low   25th   50th   75th   High                <27   27     45     63     >63  LP-IR Score is inaccurate if patient is non-fasting. The LP-IR score is a laboratory developed index that has been  associated with insulin resistance and diabetes risk and should be  used as one component of a physician's clinical assessment.       Narrative    Test(s) 530977-NIU-W; 279181-GCT-K; 123476-Triglycerides; 860244-  Cholesterol, Total; 420014-CJF-O (Total); 884297-Small LDL-P; 587729-  LDL Size; 451843-CH-ZA Score  was developed and its performance characteristics determined  by [facility]. It has not been cleared or approved by the Food  and Drug Administration. Performed at:  [address] [facility]  [address]  [id number]  : Glorietta Dandy MD, Phone:  [phone]        EKG:    Results for orders placed or performed during the hospital encounter of 01/12/22   EKG, 12 LEAD, INITIAL   Result Value Ref Range    Ventricular Rate 106 BPM    Atrial Rate 106 BPM    P-R Interval 148 ms    QRS Duration 108 ms    Q-T Interval 322 ms    QTC Calculation (Bezet) 427 ms    Calculated P Axis 68 degrees    Calculated R Axis 52 degrees    Calculated T Axis 37 degrees    Diagnosis       Sinus tachycardia  Incomplete right bundle branch block  Borderline ECG  No previous ECGs available  Confirmed by Haydee Cordova MD ([id number]) on 1/12/2022 4:22:17 PM       No prolonged QTc noted. (Acceptable QTC upper limits:  440 ms for Males, 460 ms for Females)    ASSESSMENT     ICD-10-CM ICD-9-CM    1. Obesity, Class III, BMI 40-49.9 (morbid obesity) ([facility] [address]  E66.01 278.01    2. High cholesterol  E78.00 272.0     w elevated LDLP and sdLDL, improved   3. Hyperkalemia  E87.5 276.7 EKG, 12 LEAD, INITIAL   4. MAHI on CPAP  G47.33 327.23     Z99.89 V46.8    5. Insulin resistance  E88.81 277.7 metFORMIN (GLUMETZA) 1,000 mg TG24 24 hour tablet   6. Vitamin D deficiency  E55.9 268.9     resolved w rx vit d   7. Weight loss  R63.4 783.21    8. Other constipation  K59.09 564.09     resolved   9. Adjustment disorder with anxious mood  F43.22 309.24     stable   10. Vegan diet  Z78.9 V49.89    11. Chronic GERD  K21.9 530.81     resolved   12. S/P laparoscopic cholecystectomy  Z90.49 V45.89    13. BMI 40.0-44.9, adult ([facility] [address]  Z68.41 V85.41        We discussed the expected course, resolution and complications of the diagnosis(es) in detail. WEIGHT MANAGEMENT PLAN    Chart was reviewed and updated during the office visit today.      Emphasized the importance of the patient continuing care from current primary care provider and other specialists while participating in this weight management program.  Patient has full access to all our office notes, orders, lab results on [employer] and can provide them copies, if desired. Advised patient to follow up with pcp for any acute symptoms and Health Maintenance. Reviewed office notes from other health care providers. Continue follow up as directed by these providers. Referrals given as noted above. Reminded patients who are female gender and of reproductive age that with weight loss, they may become more fertile. Recommended the use of condoms or some reliable form of contraception if pregnancy is not desired. Notify our office immediately if patient becomes pregnant. Reviewed and discussed most recent lab results and EKG. If not available today, advised patient to sign a release to provide our program a copy of each. Recheck pertinent labs monthly while participating in VLCD using New Direction meal replacements, as discussed to identify electrolyte abnormalities and guide therapeutic approach. An order form for pertinent labs was given to patient today. Patient was advised to have the labwork performed 1 week prior to the next monthly appointment with me, if participating in VLCD approach. Advised patient to sign up for [employer] and view lab results directly. Notify me by [facility] if any questions or concerns arise. Labs ordered today will be reviewed in detail at the next office visit and time allowed for any questions regarding the results. Discussed the patient's medications, BMI, anthropometric measures and goals with patient. Informed patient that weight loss goal of 5-10% in 6-12 months has shown significant improvement in obesity and its related health conditions including DM, heart disease, asthma.   A key study published in Arthritis & Rheumatism of Overweight and Obese Adults with OA found that losing 1 pound of weight resulted in 4 pounds of pressure being removed from the knees. Continue current medications as directed by prescribers. Weight loss medication was prescribed today as noted above and sent electronically to the pharmacy on file after discussing risks, benefits, costs, interactions, alternatives, contraindications and potential side effects:  Change Metformin 500 mg XR 2 po BID to 1000 mg 1 po BID for IR. Identified and discussed medications patient is taking that can cause weight gain or weight loss as recorded on patient's medication list. Advised patient not to stop any use without discussing with the prescribing provider. Ask prescribing providers to consider more weight neutral or weight negative options. Will monitor patient's weight and health with continued use. Supplement recommendations: Take OTC Magnesium 400 mg po at night prn sleep, muscle cramping, constipation. Take OTC vit B12 1000 mcg daily orally if taking Metformin or experiencing numbness and tingling. Take OTC Fish Oil 1000 mg with EPA and -1,000 mg daily if experiencing dry skin, low HDL. Use with caution if history of heartburn exists. Increase fiber intake, consider adding fiber products (I.e. fiber tea, fiber shakes) or try OTC Fiber products (I.e. Benefiber, Metamucil, psyllium, etc) if experiencing constipation. Take OTC Lactaid with meal replacements, if lactose intolerance history exists or symptoms begin. Referred patient to [facility] Patient Manual for recommended antidotes, if any new symptoms or side effects have been experienced once dietary approach is initiated. Advised patient to notify our office if this occurs. Dietary Prescription:   Ben Hess has fulfilled [facility] program requirements this month by completing homework forms, attending educational classes, nurse triage visits and monthly provider appointments as agreed and remains in good standing.   Reviewed and appreciate registered dietician note for nutritional counseling. Patient has attended all requirements of the program over the past month and is in good standing. Reviewed and appreciate weekly nurse visits and agree with documentation. Followed up on any patient concerns today. Recommended meal plan:  New Direction Very Low Calorie Dietary approach, 800 kcal/day, 3-4 ND meal replacements daily. Make sure proper daily calories are consumed. Encouraged patient to stay within the recommended amount of calories per day. Do not skip meals. Eat meals every 3-4 hours routinely to prevent negative side effects like low blood sugar. Strongly encouraged patient to consume a minimum of 2 L (67 oz) of water daily up to half your body weight in ounces of water while utilizing this dietary approach. Avoid sugar-sweetened beverages including soda, juice and alcohol. Limit use of water enhancers. Try water infusion recipes with fresh herbs, fruit and vegetables. Limit caffeine, will allow 1 8 oz cup of black coffee or green tea (to stimulate release of Adiponectin and promote fat burning.)  Schedule 1:1 session with RD. Exercise Prescription:   Advised minimal, gradual increase and as tolerated. Emphasized importance of performing limited physical activity 5 days a week and stretching daily and reducing sedentary time. If already exercising, reduce it to half the time and intensity while determining the level of tolerance for the amount of caloric intake. A goal of 30 minutes physical activity daily is recommended for health benefit and at least 60 minutes daily to prevent weight regain. During weight loss phase, no less than 75% of this time needs to be performing aerobic (i.e. Walking) activity with no more than 25% of the time performing resistance exercises (i.e. Weight lifting, strengthening, toning).     During weight maintenance phase, 50% of the physical activity time needs to be spent performing aerobic activity with 50% of the total time performing resistance exercises. Behavior and Lifestyle Prescription:  Counseled patient on stressors, stress management and self-care approaches. Encouraged pt to seek formal counseling to further address stressors mentioned today. If already receiving formal counseling please continue these sessions routinely. Go to ER if any thoughts or attempts of suicide are experienced. Referred patient to work with a counselor for further evaluation and intervention. Patient expressed appreciation. Sleep Prescription:   A goal of 7-9 hours nightly of uninterrupted sleep is recommended to turn off the Grehlin hormone to be released from the stomach and triggers appetite while promoting weight gain. Proper rest turns on Leptin hormone to be released from white adipose tissue and promotes weight loss. Avoid caffeine 6-12 hours before bedtime to reduce risk of sleep disturbance and bladder irritation. Discussed snoring, symptoms of Sleep Apnea and improvements with weight loss. Strongly encouraged compliance with CPAP, if Sleep Apnea has been diagnosed. Advised patient to follow up with sleep specialist for every 25 pounds lost to see if CPAP settings need to be adjusted, if MAHI is present and CPAP is used. Counseled patient on health topics:    Obesity, Insulin Resistance, VLCD and LCD dietary approaches, benefits, contraindications, possible side effects to these diets and how to prevent poor outcomes (including staying hydrated, limit physical exercise while transitioning into this program, avoid skipping meals, etc), weight loss goals, sleep hygiene, stressors, barriers to losing weight and keeping weight off, strategies to overcome habits or challenges to approve or continue adherence and stressors. Immunizations noted. Covid-19 vaccines recommended, if patient has not had it. Obesity may increase risk for severe illness and death from Covid-19 disease.     Offered empathy, encouragement, legitimation, prayers, partnership to patient. Praised patient for successes. Patient was offered a choice/choices in the treatment plan today. Patient expressed understanding with the diagnoses and the plan and agrees with recommendations. Return in about 1 month (around 5/7/2022) for ND VLCD, MEDICATION MANAGMENT, results. Total time:  30 mins spent in counseling, coordinating care, reviewing and discussing results, reviewing communication/notes from other providers, completing relevant documentation, and discussing treatment plans in reference to The primary encounter diagnosis was Obesity, Class III, BMI 40-49.9 (morbid obesity) ([facility] [address] Diagnoses of High cholesterol, Hyperkalemia, MAHI on CPAP, Insulin resistance, Vitamin D deficiency, Weight loss, Other constipation, Adjustment disorder with anxious mood, Vegan diet, Chronic GERD, S/P laparoscopic cholecystectomy, and BMI 40.0-44.9, adult (HCC) were also pertinent to this visit. THANK YOU B[facility], Not On File, HENRY FOR ALLOWING ME THE PRIVILEGE TO PARTICIPATE IN Ms. José Miguel Stapleton WITH RESPECT TO WEIGHT MANAGEMENT. Darrell Dwyer DO, Diplomate [employer]    There are no Patient Instructions on file for this visit.

## 2022-04-08 NOTE — PROGRESS NOTES
[facility]  Metabolic Weight Loss Program        Patient's Name: Sangita Nixon  : 1970    This patient is enrolled in [address] [place] [address] Weight Loss Program and attended the required weekly virtual nutrition class hosted via [employer].       Jeremy Castano, MS, RD, LDN

## 2022-04-15 ENCOUNTER — TELEPHONE (OUTPATIENT)
Dept: SURGERY | Age: 52
End: 2022-04-15

## 2022-04-15 NOTE — TELEPHONE ENCOUNTER
Called patient returning her missed call requesting to cancel her triage appointment for today, Friday 4/15/22. Cancelled triage and called patient in attempt to reschedule. No answer, left voicemail advising to return call.

## 2022-05-02 NOTE — PROGRESS NOTES
Nahid Valdez presents for weekly or bi-monthly evaluation of Obesity Body mass index is 44.94 kg/m². Visit Vitals  /78 (BP 1 Location: Right arm, BP Patient Position: Sitting, BP Cuff Size: Thigh)   Pulse 89   Temp 98.2 °F (36.8 °C)   Resp 20   Ht 5' 10\" (1.778 m)   Wt 313 lb 3.2 oz (142.1 kg)   SpO2 96%   BMI 44.94 kg/m²       Wt Readings from Last 3 Encounters:   04/07/22 309 lb 1.6 oz (140.2 kg)   04/01/22 313 lb 3.2 oz (142.1 kg)   03/25/22 318 lb 12.8 oz (144.6 kg)       1. Obesity, Class III, BMI 40-49.9 (morbid obesity) ([address]         I have reviewed and agree with nurse documentation of Weekly Education Class nurse progress note for [employer] [facility] [place]). Monico Philip is compliant with program requirements and may continue participation utilizing the [place] meal replacements, as discussed. Patient has been advised to refer to the [facility] Patient Manual and notify us if any side effects to this meal plan are present and/or persist.  Monico Philip may continue the current dietary approach, care and follow up at the monthly office visit with the provider, as scheduled. Follow-up and Dispositions    · Return in about 1 week (around 4/8/2022) for weight check. Documentation true and accepted by Kwan Prather.  Aris Rosa, AOBFP, Diplomate SAL[employer] GILLETTE

## 2022-09-15 ENCOUNTER — DOCUMENTATION ONLY (OUTPATIENT)
Dept: SURGERY | Age: 52
End: 2022-09-15

## 2022-09-15 NOTE — PROGRESS NOTES
Natasha Epperson, [facility], at [facility] [employer] in [place], [place], [phone], called to report that the patient's original Rx of [facility] written by Dr. Gloria Zaragoza, was filled incorrectly. It was originally filled for [employer], not [employer]. .  Technically metformin, but different mechanism of release. She will now fill it for [facility]. She stated that the patient's insurance may not cover it and may need a PA.

## 2022-09-19 ENCOUNTER — DOCUMENTATION ONLY (OUTPATIENT)
Dept: SURGERY | Age: 52
End: 2022-09-19

## 2022-09-19 NOTE — PROGRESS NOTES
Hayden Sahni, Certified Pharmacy Technician at [facility] stated that the patient is requesting a medication refill from Dr. Nj Rosas. I advised Hayden Sahni that Dr. Nj Rosas is no longer at this practice and the patient has not been seen since April. Hayden Sahni could not discuss which medication needed to be refilled since I told her that Dr. Nj Rosas no longer practices here and that the patient has not been seen since April (and needs an appointment). If the medication is her Ergocalciferol 50,000 units the patient will need to obtain that from her PCP. Hayden Sahni verbalized understanding and will let the patient know.

## 2023-02-01 RX ORDER — METFORMIN HYDROCHLORIDE 1000 MG/1
1000 TABLET, FILM COATED, EXTENDED RELEASE ORAL 2 TIMES DAILY
COMMUNITY
Start: 2022-04-07

## 2023-02-01 RX ORDER — ERGOCALCIFEROL 1.25 MG/1
50000 CAPSULE ORAL
COMMUNITY
Start: 2022-02-11

## 2023-05-24 ENCOUNTER — PRE-OP/H&P (OUTPATIENT)
Dept: URBAN - METROPOLITAN AREA CLINIC 2 | Facility: CLINIC | Age: 53
End: 2023-05-24

## 2023-05-24 VITALS
SYSTOLIC BLOOD PRESSURE: 122 MMHG | DIASTOLIC BLOOD PRESSURE: 78 MMHG | HEART RATE: 78 BPM | HEIGHT: 70 IN | WEIGHT: 293 LBS | BODY MASS INDEX: 41.95 KG/M2

## 2023-05-24 DIAGNOSIS — H25.812: ICD-10-CM

## 2023-05-24 PROCEDURE — 92015 DETERMINE REFRACTIVE STATE: CPT

## 2023-05-24 PROCEDURE — 99499 UNLISTED E&M SERVICE: CPT

## 2023-05-24 PROCEDURE — 92025 CPTRIZED CORNEAL TOPOGRAPHY: CPT

## 2023-05-24 PROCEDURE — 92136 OPHTHALMIC BIOMETRY: CPT

## 2023-05-24 ASSESSMENT — KERATOMETRY
OS_K1POWER_DIOPTERS: 41.25
OD_K1POWER_DIOPTERS: 36.25
OD_AXISANGLE_DEGREES: 142
OD_AXISANGLE2_DEGREES: 52
OS_AXISANGLE2_DEGREES: 90
OS_AXISANGLE_DEGREES: 180
OD_K2POWER_DIOPTERS: 36.50
OS_K2POWER_DIOPTERS: 41.25

## 2023-05-24 ASSESSMENT — VISUAL ACUITY
OS_CC: 20/100-1
OD_CC: 20/25+2
OS_BAT: 20/400

## 2023-05-24 ASSESSMENT — TONOMETRY
OD_IOP_MMHG: 14
OS_IOP_MMHG: 16

## 2023-06-06 ENCOUNTER — SURGERY/PROCEDURE (OUTPATIENT)
Dept: URBAN - METROPOLITAN AREA SURGERY 1 | Facility: SURGERY | Age: 53
End: 2023-06-06

## 2023-06-06 DIAGNOSIS — H25.812: ICD-10-CM

## 2023-06-06 PROCEDURE — 66984 XCAPSL CTRC RMVL W/O ECP: CPT

## 2023-06-07 ENCOUNTER — POST-OP (OUTPATIENT)
Dept: URBAN - METROPOLITAN AREA CLINIC 2 | Facility: CLINIC | Age: 53
End: 2023-06-07

## 2023-06-07 DIAGNOSIS — Z96.1: ICD-10-CM

## 2023-06-07 PROCEDURE — 99024 POSTOP FOLLOW-UP VISIT: CPT

## 2023-06-07 ASSESSMENT — KERATOMETRY
OS_K2POWER_DIOPTERS: 41.25
OS_AXISANGLE_DEGREES: 180
OS_K1POWER_DIOPTERS: 41.25
OS_AXISANGLE2_DEGREES: 90
OD_K1POWER_DIOPTERS: 36.25
OD_AXISANGLE_DEGREES: 142
OD_AXISANGLE2_DEGREES: 52
OD_K2POWER_DIOPTERS: 36.50

## 2023-06-07 ASSESSMENT — VISUAL ACUITY: OS_SC: 20/20-1

## 2023-06-07 ASSESSMENT — TONOMETRY: OS_IOP_MMHG: 16

## 2023-07-05 ENCOUNTER — POST-OP (OUTPATIENT)
Dept: URBAN - METROPOLITAN AREA CLINIC 2 | Facility: CLINIC | Age: 53
End: 2023-07-05

## 2023-07-05 DIAGNOSIS — Z96.1: ICD-10-CM

## 2023-07-05 PROCEDURE — 99024 POSTOP FOLLOW-UP VISIT: CPT

## 2023-07-05 ASSESSMENT — VISUAL ACUITY
OS_SC: 20/20
OD_SC: 20/20
OS_SC: J2
OD_SC: J2

## 2023-07-05 ASSESSMENT — TONOMETRY
OS_IOP_MMHG: 15
OD_IOP_MMHG: 14

## 2024-02-13 NOTE — PROGRESS NOTES
[facility]  Metabolic Weight Loss Program        Patient's Name: Malik Quinones  : 1970    This patient is enrolled in [address] [place] [address] Weight Loss Program and attended the required weekly virtual nutrition class hosted via [employer].       Carolin Marques, MS, RD, LDN Detail Level: Detailed

## 2024-04-09 ENCOUNTER — COMPREHENSIVE EXAM (OUTPATIENT)
Dept: URBAN - METROPOLITAN AREA CLINIC 2 | Facility: CLINIC | Age: 54
End: 2024-04-09

## 2024-04-09 DIAGNOSIS — H43.812: ICD-10-CM

## 2024-04-09 DIAGNOSIS — D31.31: ICD-10-CM

## 2024-04-09 DIAGNOSIS — Z96.1: ICD-10-CM

## 2024-04-09 PROCEDURE — 92014 COMPRE OPH EXAM EST PT 1/>: CPT

## 2024-04-09 ASSESSMENT — TONOMETRY
OD_IOP_MMHG: 14
OS_IOP_MMHG: 14

## 2024-04-09 ASSESSMENT — VISUAL ACUITY
OS_SC: 20/20
OD_SC: 20/20